# Patient Record
Sex: MALE | Race: WHITE | Employment: FULL TIME | URBAN - METROPOLITAN AREA
[De-identification: names, ages, dates, MRNs, and addresses within clinical notes are randomized per-mention and may not be internally consistent; named-entity substitution may affect disease eponyms.]

---

## 2018-03-15 ENCOUNTER — TELEPHONE (OUTPATIENT)
Dept: ADMINISTRATIVE | Age: 63
End: 2018-03-15

## 2018-03-16 ENCOUNTER — OFFICE VISIT (OUTPATIENT)
Dept: FAMILY MEDICINE CLINIC | Age: 63
End: 2018-03-16
Payer: COMMERCIAL

## 2018-03-16 VITALS
RESPIRATION RATE: 16 BRPM | BODY MASS INDEX: 31.22 KG/M2 | WEIGHT: 223 LBS | HEART RATE: 67 BPM | DIASTOLIC BLOOD PRESSURE: 80 MMHG | SYSTOLIC BLOOD PRESSURE: 139 MMHG | HEIGHT: 71 IN

## 2018-03-16 DIAGNOSIS — G89.29 CHRONIC PAIN OF BOTH KNEES: ICD-10-CM

## 2018-03-16 DIAGNOSIS — M25.562 CHRONIC PAIN OF BOTH KNEES: ICD-10-CM

## 2018-03-16 DIAGNOSIS — F41.1 GENERALIZED ANXIETY DISORDER: Primary | ICD-10-CM

## 2018-03-16 DIAGNOSIS — J45.20 MILD INTERMITTENT ASTHMA WITHOUT COMPLICATION: Chronic | ICD-10-CM

## 2018-03-16 DIAGNOSIS — M25.561 CHRONIC PAIN OF BOTH KNEES: ICD-10-CM

## 2018-03-16 DIAGNOSIS — I10 ESSENTIAL HYPERTENSION: Chronic | ICD-10-CM

## 2018-03-16 PROCEDURE — 99214 OFFICE O/P EST MOD 30 MIN: CPT | Performed by: FAMILY MEDICINE

## 2018-03-16 ASSESSMENT — PATIENT HEALTH QUESTIONNAIRE - PHQ9
1. LITTLE INTEREST OR PLEASURE IN DOING THINGS: 0
SUM OF ALL RESPONSES TO PHQ QUESTIONS 1-9: 0
SUM OF ALL RESPONSES TO PHQ9 QUESTIONS 1 & 2: 0
2. FEELING DOWN, DEPRESSED OR HOPELESS: 0

## 2018-03-23 ENCOUNTER — TELEPHONE (OUTPATIENT)
Dept: FAMILY MEDICINE CLINIC | Age: 63
End: 2018-03-23

## 2018-04-17 ENCOUNTER — OFFICE VISIT (OUTPATIENT)
Dept: FAMILY MEDICINE CLINIC | Age: 63
End: 2018-04-17
Payer: COMMERCIAL

## 2018-04-17 VITALS
RESPIRATION RATE: 16 BRPM | SYSTOLIC BLOOD PRESSURE: 132 MMHG | DIASTOLIC BLOOD PRESSURE: 65 MMHG | BODY MASS INDEX: 32.64 KG/M2 | WEIGHT: 234 LBS | HEART RATE: 55 BPM

## 2018-04-17 DIAGNOSIS — F41.1 GENERALIZED ANXIETY DISORDER: ICD-10-CM

## 2018-04-17 PROCEDURE — 99213 OFFICE O/P EST LOW 20 MIN: CPT | Performed by: FAMILY MEDICINE

## 2018-04-17 ASSESSMENT — PATIENT HEALTH QUESTIONNAIRE - PHQ9
SUM OF ALL RESPONSES TO PHQ9 QUESTIONS 1 & 2: 0
2. FEELING DOWN, DEPRESSED OR HOPELESS: 0
SUM OF ALL RESPONSES TO PHQ QUESTIONS 1-9: 0
1. LITTLE INTEREST OR PLEASURE IN DOING THINGS: 0

## 2018-10-11 ENCOUNTER — HOSPITAL ENCOUNTER (OUTPATIENT)
Age: 63
Discharge: HOME | End: 2018-10-11
Payer: COMMERCIAL

## 2018-10-11 ENCOUNTER — HOSPITAL ENCOUNTER (OUTPATIENT)
Age: 63
Discharge: HOME | End: 2018-10-13
Payer: COMMERCIAL

## 2018-10-11 LAB
ALBUMIN SERPL-MCNC: 4.5 G/DL (ref 3.5–5.2)
ALP BLD-CCNC: 78 U/L (ref 40–129)
ALT SERPL-CCNC: 15 U/L (ref 0–40)
ANION GAP SERPL CALCULATED.3IONS-SCNC: 11 MMOL/L (ref 7–16)
AST SERPL-CCNC: 15 U/L (ref 0–39)
BILIRUB SERPL-MCNC: 0.5 MG/DL (ref 0–1.2)
BUN BLDV-MCNC: 18 MG/DL (ref 8–23)
CALCIUM SERPL-MCNC: 9.7 MG/DL (ref 8.6–10.2)
CHLORIDE BLD-SCNC: 103 MMOL/L (ref 98–107)
CO2: 27 MMOL/L (ref 22–29)
CREAT SERPL-MCNC: 0.9 MG/DL (ref 0.7–1.2)
GFR AFRICAN AMERICAN: >60
GFR NON-AFRICAN AMERICAN: >60 ML/MIN/1.73
GLUCOSE BLD-MCNC: 89 MG/DL (ref 74–109)
POTASSIUM SERPL-SCNC: 4.3 MMOL/L (ref 3.5–5)
SODIUM BLD-SCNC: 141 MMOL/L (ref 132–146)
TOTAL PROTEIN: 7.6 G/DL (ref 6.4–8.3)

## 2018-10-11 PROCEDURE — 36415 COLL VENOUS BLD VENIPUNCTURE: CPT

## 2018-10-11 PROCEDURE — 80053 COMPREHEN METABOLIC PANEL: CPT

## 2018-10-15 ENCOUNTER — OFFICE VISIT (OUTPATIENT)
Dept: FAMILY MEDICINE CLINIC | Age: 63
End: 2018-10-15
Payer: COMMERCIAL

## 2018-10-15 VITALS
RESPIRATION RATE: 18 BRPM | HEIGHT: 71 IN | DIASTOLIC BLOOD PRESSURE: 76 MMHG | WEIGHT: 230 LBS | SYSTOLIC BLOOD PRESSURE: 138 MMHG | BODY MASS INDEX: 32.2 KG/M2 | HEART RATE: 65 BPM

## 2018-10-15 DIAGNOSIS — I10 ESSENTIAL HYPERTENSION: Primary | Chronic | ICD-10-CM

## 2018-10-15 DIAGNOSIS — N52.2 DRUG-INDUCED ERECTILE DYSFUNCTION: ICD-10-CM

## 2018-10-15 DIAGNOSIS — F41.1 GENERALIZED ANXIETY DISORDER: ICD-10-CM

## 2018-10-15 DIAGNOSIS — Z23 NEED FOR INFLUENZA VACCINATION: ICD-10-CM

## 2018-10-15 DIAGNOSIS — E78.2 MIXED HYPERLIPIDEMIA: ICD-10-CM

## 2018-10-15 PROCEDURE — 90686 IIV4 VACC NO PRSV 0.5 ML IM: CPT | Performed by: FAMILY MEDICINE

## 2018-10-15 PROCEDURE — 99214 OFFICE O/P EST MOD 30 MIN: CPT | Performed by: FAMILY MEDICINE

## 2018-10-15 PROCEDURE — 90471 IMMUNIZATION ADMIN: CPT | Performed by: FAMILY MEDICINE

## 2018-10-15 ASSESSMENT — PATIENT HEALTH QUESTIONNAIRE - PHQ9
SUM OF ALL RESPONSES TO PHQ QUESTIONS 1-9: 0
SUM OF ALL RESPONSES TO PHQ QUESTIONS 1-9: 0
1. LITTLE INTEREST OR PLEASURE IN DOING THINGS: 0
SUM OF ALL RESPONSES TO PHQ9 QUESTIONS 1 & 2: 0
2. FEELING DOWN, DEPRESSED OR HOPELESS: 0

## 2018-10-15 NOTE — PROGRESS NOTES
Vaccine Information Sheet, \"Influenza - Inactivated\"  given to Serena Herman, or parent/legal guardian of  Serena Herman and verbalized understanding. Patient responses:    Have you ever had a reaction to a flu vaccine? No  Are you able to eat eggs without adverse effects? Yes  Do you have any current illness? No  Have you ever had Guillian Covina Syndrome? No    Flu vaccine given per order. Please see immunization tab.
rhinorrhea. No sore throat, no hearing loss. No cough, no dyspnea. No chest pain on exertion. No edema. No intermittent claudication. No abdominal pain. No nausea, no change in bowel habits. No joint pain, no swelling. No skin lesions, no rash. No heat or cold intolerance. No weight changes. No flank pain, no hematuria. No genital lesions or abnormalities. Examination:    /76   Pulse 65   Resp 18   Ht 5' 11\" (1.803 m)   Wt 230 lb (104.3 kg)   BMI 32.08 kg/m²   General appearance: healthy, no distress. Alert, oriented x 3. HEENT: unremarkable. Neck: supple. No masses or bruits. No cervical or supraclavicular lymphadenopathy. Thyroid: no goiter. Chest: no deformities. Lungs: clear, no wheezing or crackles. Heart: regular rate and rhythm, normal S1S2,  no murmurs. Abdomen: soft , no hepato or splenomegaly. No bruits. No masses. Extremities: no edema. Vascular: dorsalis pedis/ posterior tibial pulses are normal bilaterally. Gait: normal.  Mood: normal.  Affect: congruent. BMI was elevated today, and weight loss plan recommended is : conventional weight loss and daily exercise regimen. Hospital Outpatient Visit on 10/11/2018   Component Date Value    Sodium 10/11/2018 141     Potassium 10/11/2018 4.3     Chloride 10/11/2018 103     CO2 10/11/2018 27     Anion Gap 10/11/2018 11     Glucose 10/11/2018 89     BUN 10/11/2018 18     CREATININE 10/11/2018 0.9     GFR Non- 10/11/2018 >60     GFR  10/11/2018 >60     Calcium 10/11/2018 9.7     Total Protein 10/11/2018 7.6     Alb 10/11/2018 4.5     Total Bilirubin 10/11/2018 0.5     Alkaline Phosphatase 10/11/2018 78     ALT 10/11/2018 15     AST 10/11/2018 15        Assessment / Plan  1. Need for influenza vaccination  Administered    2. Essential hypertension  Fairly well controlled. Continue same. Limit salt intake and weight loss.     3. Generalized anxiety disorder  Better on zoloft  But

## 2018-10-22 DIAGNOSIS — E78.2 MIXED HYPERLIPIDEMIA: ICD-10-CM

## 2018-10-23 RX ORDER — SIMVASTATIN 40 MG
TABLET ORAL
Qty: 90 TABLET | Refills: 1 | Status: SHIPPED | OUTPATIENT
Start: 2018-10-23 | End: 2019-04-30 | Stop reason: SDUPTHER

## 2019-04-19 ENCOUNTER — HOSPITAL ENCOUNTER (OUTPATIENT)
Age: 64
Discharge: HOME | End: 2019-04-19
Payer: COMMERCIAL

## 2019-04-19 DIAGNOSIS — I10 ESSENTIAL HYPERTENSION: Chronic | ICD-10-CM

## 2019-04-19 DIAGNOSIS — E78.2 MIXED HYPERLIPIDEMIA: ICD-10-CM

## 2019-04-19 LAB
ALBUMIN SERPL-MCNC: 4.1 G/DL (ref 3.5–5.2)
ALP BLD-CCNC: 68 U/L (ref 40–129)
ALT SERPL-CCNC: 16 U/L (ref 0–40)
ANION GAP SERPL CALCULATED.3IONS-SCNC: 8 MMOL/L (ref 7–16)
AST SERPL-CCNC: 14 U/L (ref 0–39)
BILIRUB SERPL-MCNC: 0.5 MG/DL (ref 0–1.2)
BUN BLDV-MCNC: 22 MG/DL (ref 8–23)
CALCIUM SERPL-MCNC: 8.8 MG/DL (ref 8.6–10.2)
CHLORIDE BLD-SCNC: 106 MMOL/L (ref 98–107)
CHOLESTEROL, TOTAL: 134 MG/DL (ref 0–199)
CO2: 25 MMOL/L (ref 22–29)
CREAT SERPL-MCNC: 1 MG/DL (ref 0.7–1.2)
GFR AFRICAN AMERICAN: >60
GFR NON-AFRICAN AMERICAN: >60 ML/MIN/1.73
GLUCOSE BLD-MCNC: 108 MG/DL (ref 74–99)
HDLC SERPL-MCNC: 41 MG/DL
LDL CHOLESTEROL CALCULATED: 69 MG/DL (ref 0–99)
POTASSIUM SERPL-SCNC: 4.4 MMOL/L (ref 3.5–5)
SODIUM BLD-SCNC: 139 MMOL/L (ref 132–146)
TOTAL PROTEIN: 6.7 G/DL (ref 6.4–8.3)
TRIGL SERPL-MCNC: 121 MG/DL (ref 0–149)
VLDLC SERPL CALC-MCNC: 24 MG/DL

## 2019-04-19 PROCEDURE — 80053 COMPREHEN METABOLIC PANEL: CPT

## 2019-04-19 PROCEDURE — 36415 COLL VENOUS BLD VENIPUNCTURE: CPT

## 2019-04-19 PROCEDURE — 80061 LIPID PANEL: CPT

## 2019-04-22 ENCOUNTER — TELEPHONE (OUTPATIENT)
Dept: FAMILY MEDICINE CLINIC | Age: 64
End: 2019-04-22

## 2019-04-22 ENCOUNTER — OFFICE VISIT (OUTPATIENT)
Dept: FAMILY MEDICINE CLINIC | Age: 64
End: 2019-04-22
Payer: COMMERCIAL

## 2019-04-22 VITALS
DIASTOLIC BLOOD PRESSURE: 61 MMHG | WEIGHT: 248 LBS | HEART RATE: 64 BPM | RESPIRATION RATE: 18 BRPM | SYSTOLIC BLOOD PRESSURE: 138 MMHG | OXYGEN SATURATION: 96 % | BODY MASS INDEX: 34.72 KG/M2 | HEIGHT: 71 IN

## 2019-04-22 DIAGNOSIS — E78.2 MIXED HYPERLIPIDEMIA: ICD-10-CM

## 2019-04-22 DIAGNOSIS — M25.561 CHRONIC PAIN OF BOTH KNEES: Chronic | ICD-10-CM

## 2019-04-22 DIAGNOSIS — I25.118 CORONARY ARTERY DISEASE INVOLVING NATIVE CORONARY ARTERY OF NATIVE HEART WITH OTHER FORM OF ANGINA PECTORIS (HCC): Chronic | ICD-10-CM

## 2019-04-22 DIAGNOSIS — I10 ESSENTIAL HYPERTENSION: Primary | Chronic | ICD-10-CM

## 2019-04-22 DIAGNOSIS — J45.20 MILD INTERMITTENT ASTHMA WITHOUT COMPLICATION: Chronic | ICD-10-CM

## 2019-04-22 DIAGNOSIS — F41.1 GENERALIZED ANXIETY DISORDER: ICD-10-CM

## 2019-04-22 DIAGNOSIS — G89.29 CHRONIC PAIN OF BOTH KNEES: Chronic | ICD-10-CM

## 2019-04-22 DIAGNOSIS — M25.562 CHRONIC PAIN OF BOTH KNEES: Chronic | ICD-10-CM

## 2019-04-22 DIAGNOSIS — Z13.1 SCREENING FOR DIABETES MELLITUS: ICD-10-CM

## 2019-04-22 PROCEDURE — 99396 PREV VISIT EST AGE 40-64: CPT | Performed by: FAMILY MEDICINE

## 2019-04-22 ASSESSMENT — PATIENT HEALTH QUESTIONNAIRE - PHQ9
SUM OF ALL RESPONSES TO PHQ QUESTIONS 1-9: 0
2. FEELING DOWN, DEPRESSED OR HOPELESS: 0
SUM OF ALL RESPONSES TO PHQ QUESTIONS 1-9: 0
1. LITTLE INTEREST OR PLEASURE IN DOING THINGS: 0
SUM OF ALL RESPONSES TO PHQ9 QUESTIONS 1 & 2: 0

## 2019-04-22 NOTE — TELEPHONE ENCOUNTER
Schedule lexiscan nuclear stress test ASAP  Angina  History of CAD  Also schedule apt with Dr Eduardo Morrell for same  Thanks.
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
## 2019-04-23 ENCOUNTER — TELEPHONE (OUTPATIENT)
Dept: ADMINISTRATIVE | Age: 64
End: 2019-04-23

## 2019-04-23 NOTE — TELEPHONE ENCOUNTER
Ana Chao called to schedule pt with Dr. Ekta RAM per Dr. Sarah Botello for CAD & CP. Pt is scheduled to have a stress on 4/25/19 @ the hospital.  Past cardiac hx form scanned. Please call the office once you schedule the pt.

## 2019-04-25 ENCOUNTER — TELEPHONE (OUTPATIENT)
Dept: FAMILY MEDICINE CLINIC | Age: 64
End: 2019-04-25

## 2019-04-25 ENCOUNTER — HOSPITAL ENCOUNTER (OUTPATIENT)
Dept: NON INVASIVE DIAGNOSTICS | Age: 64
Discharge: HOME | End: 2019-04-25
Payer: COMMERCIAL

## 2019-04-25 ENCOUNTER — HOSPITAL ENCOUNTER (OUTPATIENT)
Dept: NUCLEAR MEDICINE | Age: 64
Discharge: HOME | End: 2019-04-25
Payer: COMMERCIAL

## 2019-04-25 VITALS — HEIGHT: 71 IN | WEIGHT: 248 LBS | BODY MASS INDEX: 34.72 KG/M2

## 2019-04-25 DIAGNOSIS — I25.118 CORONARY ARTERY DISEASE INVOLVING NATIVE CORONARY ARTERY OF NATIVE HEART WITH OTHER FORM OF ANGINA PECTORIS (HCC): Chronic | ICD-10-CM

## 2019-04-25 LAB
LV EF: 68 %
LVEF MODALITY: NORMAL

## 2019-04-25 PROCEDURE — 93017 CV STRESS TEST TRACING ONLY: CPT

## 2019-04-25 PROCEDURE — 93018 CV STRESS TEST I&R ONLY: CPT | Performed by: INTERNAL MEDICINE

## 2019-04-25 PROCEDURE — 6360000002 HC RX W HCPCS: Performed by: FAMILY MEDICINE

## 2019-04-25 PROCEDURE — A9500 TC99M SESTAMIBI: HCPCS | Performed by: RADIOLOGY

## 2019-04-25 PROCEDURE — 3430000000 HC RX DIAGNOSTIC RADIOPHARMACEUTICAL: Performed by: RADIOLOGY

## 2019-04-25 PROCEDURE — 78452 HT MUSCLE IMAGE SPECT MULT: CPT

## 2019-04-25 PROCEDURE — 93016 CV STRESS TEST SUPVJ ONLY: CPT | Performed by: INTERNAL MEDICINE

## 2019-04-25 RX ADMIN — Medication 10 MILLICURIE: at 09:06

## 2019-04-25 RX ADMIN — REGADENOSON 0.4 MG: 0.08 INJECTION, SOLUTION INTRAVENOUS at 10:08

## 2019-04-25 RX ADMIN — Medication 30 MILLICURIE: at 09:07

## 2019-04-25 NOTE — TELEPHONE ENCOUNTER
Wife, Mace Phalen wanted to know if pt still needed to see the cardiologist tomorrow since his stress test was normal?

## 2019-04-25 NOTE — PROGRESS NOTES
.        Stress Lab:  A lexiscan stress protocol was performed. There was no angina, significant arrhythmia or ST segment shift. The EKG is considered nonischemic. Isotope was injected.

## 2019-04-26 ENCOUNTER — OFFICE VISIT (OUTPATIENT)
Dept: CARDIOLOGY CLINIC | Age: 64
End: 2019-04-26
Payer: COMMERCIAL

## 2019-04-26 VITALS
DIASTOLIC BLOOD PRESSURE: 82 MMHG | RESPIRATION RATE: 16 BRPM | SYSTOLIC BLOOD PRESSURE: 138 MMHG | HEART RATE: 54 BPM | WEIGHT: 249.3 LBS | BODY MASS INDEX: 34.9 KG/M2 | HEIGHT: 71 IN

## 2019-04-26 DIAGNOSIS — I25.119 CORONARY ARTERY DISEASE WITH ANGINA PECTORIS, UNSPECIFIED VESSEL OR LESION TYPE, UNSPECIFIED WHETHER NATIVE OR TRANSPLANTED HEART (HCC): Primary | Chronic | ICD-10-CM

## 2019-04-26 PROCEDURE — 93000 ELECTROCARDIOGRAM COMPLETE: CPT | Performed by: INTERNAL MEDICINE

## 2019-04-26 PROCEDURE — 99213 OFFICE O/P EST LOW 20 MIN: CPT | Performed by: INTERNAL MEDICINE

## 2019-04-26 RX ORDER — NAPROXEN SODIUM 220 MG
220 TABLET ORAL NIGHTLY PRN
COMMUNITY
End: 2020-06-09 | Stop reason: ALTCHOICE

## 2019-04-26 NOTE — TELEPHONE ENCOUNTER
Spoke with patients wife advised to keep cardiology appointment today. Patients wife understands and agrees.

## 2019-04-26 NOTE — PROGRESS NOTES
Gastrointestinal: negative for abdominal pain, diarrhea, nausea and vomiting  Genitourinary:negative for dysuria and hematuria  Derm: negative for rash and skin lesion(s)  Neurological: negative for seizures and tremors  Endocrine: negative for diabetic symptoms including polydipsia and polyuria  Musculoskeletal: negative for CTD  Psychiatric: negative for psychosis and major depression    On examination, he is an alert, comfortable, middle-aged  male in no distress. Skin is warm and dry. Respirations are unlabored. /82   Pulse 54   Resp 16   Ht 5' 11\" (1.803 m)   Wt 249 lb 4.8 oz (113.1 kg)   BMI 34.77 kg/m² . HEENT negative for scleral icterus. Extraocular muscles intact. No facial asymmetry or central cyanosis. Neck without masses or goiter. No bruit or JVD. Cardiac apex not displaced. Rhythm regular. Abdomen normal.  Extremities without edema. Lungs are clear. EKG today shows sinus bradycardia and RBBB. The patient has a normal stress perfusion study. This does not exclude the possibility that his symptoms represented angina, although it is associated with a extremely low risk for a myocardial infarction over the next 2 years. If his symptoms would recur and become more typical for angina, then beta blocker should be increased and possibly nitrates added. He was reluctant to change anything today. Medications are reviewed. He is strongly advised regarding aerobic activity and diet modification to lose weight. Target abdominal girth is less than 38 inches. The Mediterranean diet is advised. He will be seen back in one year.     At completion of today's visit, medications include the following:    Current Outpatient Medications:     naproxen sodium (ALEVE) 220 MG tablet, Take 220 mg by mouth nightly as needed , Disp: , Rfl:     simvastatin (ZOCOR) 40 MG tablet, One daily, Disp: 90 tablet, Rfl: 1    metoprolol tartrate (LOPRESSOR) 25 MG tablet, Take 1 tablet by mouth 2 times daily, Disp: 180 tablet, Rfl: 1    aspirin 81 MG tablet, Take 81 mg by mouth daily. , Disp: , Rfl:       eHro Pierre MD

## 2019-04-29 DIAGNOSIS — E78.2 MIXED HYPERLIPIDEMIA: ICD-10-CM

## 2019-04-29 DIAGNOSIS — I10 ESSENTIAL HYPERTENSION: Chronic | ICD-10-CM

## 2019-04-30 RX ORDER — SIMVASTATIN 40 MG
TABLET ORAL
Qty: 90 TABLET | Refills: 1 | Status: SHIPPED | OUTPATIENT
Start: 2019-04-30 | End: 2019-10-29 | Stop reason: SDUPTHER

## 2019-10-29 ENCOUNTER — HOSPITAL ENCOUNTER (OUTPATIENT)
Dept: GENERAL RADIOLOGY | Age: 64
Discharge: HOME | End: 2019-10-31
Payer: COMMERCIAL

## 2019-10-29 ENCOUNTER — OFFICE VISIT (OUTPATIENT)
Dept: FAMILY MEDICINE CLINIC | Age: 64
End: 2019-10-29
Payer: COMMERCIAL

## 2019-10-29 ENCOUNTER — HOSPITAL ENCOUNTER (OUTPATIENT)
Age: 64
Discharge: HOME | End: 2019-10-31
Payer: COMMERCIAL

## 2019-10-29 ENCOUNTER — HOSPITAL ENCOUNTER (OUTPATIENT)
Age: 64
Discharge: HOME | End: 2019-10-29
Payer: COMMERCIAL

## 2019-10-29 VITALS
SYSTOLIC BLOOD PRESSURE: 133 MMHG | BODY MASS INDEX: 35.42 KG/M2 | HEIGHT: 71 IN | DIASTOLIC BLOOD PRESSURE: 71 MMHG | RESPIRATION RATE: 16 BRPM | HEART RATE: 67 BPM | WEIGHT: 253 LBS

## 2019-10-29 DIAGNOSIS — M25.562 CHRONIC PAIN OF LEFT KNEE: ICD-10-CM

## 2019-10-29 DIAGNOSIS — I10 ESSENTIAL HYPERTENSION: Chronic | ICD-10-CM

## 2019-10-29 DIAGNOSIS — R73.03 PREDIABETES: ICD-10-CM

## 2019-10-29 DIAGNOSIS — G89.29 CHRONIC PAIN OF LEFT KNEE: ICD-10-CM

## 2019-10-29 DIAGNOSIS — G89.29 CHRONIC PAIN OF RIGHT KNEE: ICD-10-CM

## 2019-10-29 DIAGNOSIS — M25.561 CHRONIC PAIN OF RIGHT KNEE: ICD-10-CM

## 2019-10-29 DIAGNOSIS — E78.2 MIXED HYPERLIPIDEMIA: ICD-10-CM

## 2019-10-29 DIAGNOSIS — M25.552 LEFT HIP PAIN: ICD-10-CM

## 2019-10-29 DIAGNOSIS — I10 ESSENTIAL HYPERTENSION: Primary | Chronic | ICD-10-CM

## 2019-10-29 DIAGNOSIS — I25.10 CORONARY ARTERY DISEASE INVOLVING NATIVE CORONARY ARTERY OF NATIVE HEART WITHOUT ANGINA PECTORIS: Chronic | ICD-10-CM

## 2019-10-29 DIAGNOSIS — M25.551 RIGHT HIP PAIN: ICD-10-CM

## 2019-10-29 DIAGNOSIS — Z13.1 SCREENING FOR DIABETES MELLITUS: ICD-10-CM

## 2019-10-29 LAB
ALBUMIN SERPL-MCNC: 4.4 G/DL (ref 3.5–5.2)
ALP BLD-CCNC: 73 U/L (ref 40–129)
ALT SERPL-CCNC: 17 U/L (ref 0–40)
ANION GAP SERPL CALCULATED.3IONS-SCNC: 10 MMOL/L (ref 7–16)
AST SERPL-CCNC: 16 U/L (ref 0–39)
BILIRUB SERPL-MCNC: 0.5 MG/DL (ref 0–1.2)
BUN BLDV-MCNC: 19 MG/DL (ref 8–23)
CALCIUM SERPL-MCNC: 9.4 MG/DL (ref 8.6–10.2)
CHLORIDE BLD-SCNC: 103 MMOL/L (ref 98–107)
CHOLESTEROL, TOTAL: 148 MG/DL (ref 0–199)
CO2: 26 MMOL/L (ref 22–29)
CREAT SERPL-MCNC: 1 MG/DL (ref 0.7–1.2)
GFR AFRICAN AMERICAN: >60
GFR NON-AFRICAN AMERICAN: >60 ML/MIN/1.73
GLUCOSE BLD-MCNC: 118 MG/DL (ref 74–99)
HBA1C MFR BLD: 5.8 % (ref 4–5.6)
HDLC SERPL-MCNC: 46 MG/DL
LDL CHOLESTEROL CALCULATED: 74 MG/DL (ref 0–99)
POTASSIUM SERPL-SCNC: 4.6 MMOL/L (ref 3.5–5)
SODIUM BLD-SCNC: 139 MMOL/L (ref 132–146)
TOTAL PROTEIN: 7.2 G/DL (ref 6.4–8.3)
TRIGL SERPL-MCNC: 140 MG/DL (ref 0–149)
VLDLC SERPL CALC-MCNC: 28 MG/DL

## 2019-10-29 PROCEDURE — 99214 OFFICE O/P EST MOD 30 MIN: CPT | Performed by: FAMILY MEDICINE

## 2019-10-29 PROCEDURE — 73502 X-RAY EXAM HIP UNI 2-3 VIEWS: CPT

## 2019-10-29 PROCEDURE — 83036 HEMOGLOBIN GLYCOSYLATED A1C: CPT

## 2019-10-29 PROCEDURE — 36415 COLL VENOUS BLD VENIPUNCTURE: CPT

## 2019-10-29 PROCEDURE — 90471 IMMUNIZATION ADMIN: CPT | Performed by: FAMILY MEDICINE

## 2019-10-29 PROCEDURE — 73562 X-RAY EXAM OF KNEE 3: CPT

## 2019-10-29 PROCEDURE — 90686 IIV4 VACC NO PRSV 0.5 ML IM: CPT | Performed by: FAMILY MEDICINE

## 2019-10-29 PROCEDURE — 80053 COMPREHEN METABOLIC PANEL: CPT

## 2019-10-29 PROCEDURE — 80061 LIPID PANEL: CPT

## 2019-10-29 RX ORDER — SIMVASTATIN 40 MG
TABLET ORAL
Qty: 90 TABLET | Refills: 1 | Status: SHIPPED
Start: 2019-10-29 | End: 2020-05-18 | Stop reason: SDUPTHER

## 2019-11-01 ENCOUNTER — TELEPHONE (OUTPATIENT)
Dept: FAMILY MEDICINE CLINIC | Age: 64
End: 2019-11-01

## 2019-11-01 DIAGNOSIS — M25.561 CHRONIC PAIN OF BOTH KNEES: Primary | ICD-10-CM

## 2019-11-01 DIAGNOSIS — M25.552 BILATERAL HIP PAIN: ICD-10-CM

## 2019-11-01 DIAGNOSIS — M25.551 BILATERAL HIP PAIN: ICD-10-CM

## 2019-11-01 DIAGNOSIS — M25.562 CHRONIC PAIN OF BOTH KNEES: Primary | ICD-10-CM

## 2019-11-01 DIAGNOSIS — G89.29 CHRONIC PAIN OF BOTH KNEES: Primary | ICD-10-CM

## 2019-12-04 ENCOUNTER — TELEPHONE (OUTPATIENT)
Dept: FAMILY MEDICINE CLINIC | Age: 64
End: 2019-12-04

## 2019-12-10 ENCOUNTER — OFFICE VISIT (OUTPATIENT)
Dept: FAMILY MEDICINE CLINIC | Age: 64
End: 2019-12-10
Payer: COMMERCIAL

## 2019-12-10 VITALS
WEIGHT: 254 LBS | HEART RATE: 61 BPM | DIASTOLIC BLOOD PRESSURE: 61 MMHG | RESPIRATION RATE: 16 BRPM | SYSTOLIC BLOOD PRESSURE: 120 MMHG | BODY MASS INDEX: 35.56 KG/M2 | HEIGHT: 71 IN

## 2019-12-10 DIAGNOSIS — M25.562 CHRONIC PAIN OF BOTH KNEES: Primary | ICD-10-CM

## 2019-12-10 DIAGNOSIS — M25.551 BILATERAL HIP PAIN: ICD-10-CM

## 2019-12-10 DIAGNOSIS — M25.552 BILATERAL HIP PAIN: ICD-10-CM

## 2019-12-10 DIAGNOSIS — G89.29 CHRONIC PAIN OF BOTH KNEES: Primary | ICD-10-CM

## 2019-12-10 DIAGNOSIS — M25.561 CHRONIC PAIN OF BOTH KNEES: Primary | ICD-10-CM

## 2019-12-10 PROCEDURE — 99212 OFFICE O/P EST SF 10 MIN: CPT | Performed by: FAMILY MEDICINE

## 2019-12-10 ASSESSMENT — PATIENT HEALTH QUESTIONNAIRE - PHQ9
1. LITTLE INTEREST OR PLEASURE IN DOING THINGS: 0
SUM OF ALL RESPONSES TO PHQ QUESTIONS 1-9: 0
SUM OF ALL RESPONSES TO PHQ9 QUESTIONS 1 & 2: 0
SUM OF ALL RESPONSES TO PHQ QUESTIONS 1-9: 0
2. FEELING DOWN, DEPRESSED OR HOPELESS: 0

## 2020-05-18 RX ORDER — SIMVASTATIN 40 MG
TABLET ORAL
Qty: 90 TABLET | Refills: 0 | Status: SHIPPED
Start: 2020-05-18 | End: 2020-06-09 | Stop reason: SDUPTHER

## 2020-06-09 ENCOUNTER — HOSPITAL ENCOUNTER (OUTPATIENT)
Age: 65
Discharge: HOME | End: 2020-06-11
Payer: COMMERCIAL

## 2020-06-09 ENCOUNTER — OFFICE VISIT (OUTPATIENT)
Dept: FAMILY MEDICINE CLINIC | Age: 65
End: 2020-06-09
Payer: COMMERCIAL

## 2020-06-09 PROCEDURE — 99214 OFFICE O/P EST MOD 30 MIN: CPT | Performed by: FAMILY MEDICINE

## 2020-06-09 PROCEDURE — 85651 RBC SED RATE NONAUTOMATED: CPT

## 2020-06-09 PROCEDURE — 86140 C-REACTIVE PROTEIN: CPT

## 2020-06-09 RX ORDER — SIMVASTATIN 40 MG
TABLET ORAL
Qty: 90 TABLET | Refills: 2 | Status: SHIPPED
Start: 2020-06-09 | End: 2021-02-02 | Stop reason: SDUPTHER

## 2020-06-09 ASSESSMENT — PATIENT HEALTH QUESTIONNAIRE - PHQ9
2. FEELING DOWN, DEPRESSED OR HOPELESS: 0
SUM OF ALL RESPONSES TO PHQ QUESTIONS 1-9: 0
1. LITTLE INTEREST OR PLEASURE IN DOING THINGS: 0
SUM OF ALL RESPONSES TO PHQ9 QUESTIONS 1 & 2: 0
SUM OF ALL RESPONSES TO PHQ QUESTIONS 1-9: 0

## 2020-06-09 ASSESSMENT — ENCOUNTER SYMPTOMS
DIARRHEA: 0
WHEEZING: 0
BLOOD IN STOOL: 1
RHINORRHEA: 0
COUGH: 0
CHEST TIGHTNESS: 0
EYE DISCHARGE: 0
CONSTIPATION: 0
EYE ITCHING: 0
SHORTNESS OF BREATH: 0
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
## 2020-06-10 VITALS
HEART RATE: 67 BPM | RESPIRATION RATE: 18 BRPM | HEIGHT: 71 IN | OXYGEN SATURATION: 97 % | TEMPERATURE: 98.6 F | SYSTOLIC BLOOD PRESSURE: 124 MMHG | DIASTOLIC BLOOD PRESSURE: 72 MMHG | BODY MASS INDEX: 34.3 KG/M2 | WEIGHT: 245 LBS

## 2020-06-10 LAB
C-REACTIVE PROTEIN: 0.1 MG/DL (ref 0–0.4)
SEDIMENTATION RATE, ERYTHROCYTE: 26 MM/HR (ref 0–15)

## 2020-06-10 NOTE — RESULT ENCOUNTER NOTE
Please call patient. His sed rate is slightly elevated but because his CRP is not, it is unlikely that he has an inflammatory arthritis that is causing his knee pain. Going to orthopedics is the best next step.

## 2020-06-25 ENCOUNTER — OFFICE VISIT (OUTPATIENT)
Dept: ORTHOPEDIC SURGERY | Age: 65
End: 2020-06-25
Payer: COMMERCIAL

## 2020-06-25 VITALS — WEIGHT: 250 LBS | TEMPERATURE: 98.4 F | HEIGHT: 70 IN | BODY MASS INDEX: 35.79 KG/M2

## 2020-06-25 PROCEDURE — 99203 OFFICE O/P NEW LOW 30 MIN: CPT | Performed by: ORTHOPAEDIC SURGERY

## 2021-01-30 ENCOUNTER — HOSPITAL ENCOUNTER (OUTPATIENT)
Age: 66
Discharge: HOME | End: 2021-01-30
Payer: COMMERCIAL

## 2021-01-30 DIAGNOSIS — I10 ESSENTIAL HYPERTENSION: Chronic | ICD-10-CM

## 2021-01-30 LAB
ALBUMIN SERPL-MCNC: 4.1 G/DL (ref 3.5–5.2)
ALP BLD-CCNC: 78 U/L (ref 40–129)
ALT SERPL-CCNC: 14 U/L (ref 0–40)
ANION GAP SERPL CALCULATED.3IONS-SCNC: 8 MMOL/L (ref 7–16)
AST SERPL-CCNC: 13 U/L (ref 0–39)
BILIRUB SERPL-MCNC: 0.6 MG/DL (ref 0–1.2)
BUN BLDV-MCNC: 19 MG/DL (ref 8–23)
CALCIUM SERPL-MCNC: 8.8 MG/DL (ref 8.6–10.2)
CHLORIDE BLD-SCNC: 104 MMOL/L (ref 98–107)
CHOLESTEROL, TOTAL: 143 MG/DL (ref 0–199)
CO2: 26 MMOL/L (ref 22–29)
CREAT SERPL-MCNC: 1 MG/DL (ref 0.7–1.2)
GFR AFRICAN AMERICAN: >60
GFR NON-AFRICAN AMERICAN: >60 ML/MIN/1.73
GLUCOSE BLD-MCNC: 107 MG/DL (ref 74–99)
HBA1C MFR BLD: 5.9 % (ref 4–5.6)
HDLC SERPL-MCNC: 43 MG/DL
LDL CHOLESTEROL CALCULATED: 74 MG/DL (ref 0–99)
POTASSIUM SERPL-SCNC: 4.5 MMOL/L (ref 3.5–5)
SODIUM BLD-SCNC: 138 MMOL/L (ref 132–146)
TOTAL PROTEIN: 7 G/DL (ref 6.4–8.3)
TRIGL SERPL-MCNC: 130 MG/DL (ref 0–149)
TSH SERPL DL<=0.05 MIU/L-ACNC: 1.5 UIU/ML (ref 0.27–4.2)
VLDLC SERPL CALC-MCNC: 26 MG/DL

## 2021-01-30 PROCEDURE — 80053 COMPREHEN METABOLIC PANEL: CPT

## 2021-01-30 PROCEDURE — 83036 HEMOGLOBIN GLYCOSYLATED A1C: CPT

## 2021-01-30 PROCEDURE — 80061 LIPID PANEL: CPT

## 2021-01-30 PROCEDURE — 36415 COLL VENOUS BLD VENIPUNCTURE: CPT

## 2021-01-30 PROCEDURE — 84443 ASSAY THYROID STIM HORMONE: CPT

## 2021-02-01 NOTE — PROGRESS NOTES
Uzair Viramontes (:  1955) is a 63 y.o. male,Established patient, here for evaluation of the following chief complaint(s):  Hypertension (per patient no issues )      ASSESSMENT/PLAN:  1. Coronary artery disease involving native coronary artery of native heart without angina pectoris  -     simvastatin (ZOCOR) 40 MG tablet; Take 1 tablet by mouth nightly One daily, Disp-30 tablet, R-5Normal  -     metoprolol tartrate (LOPRESSOR) 25 MG tablet; Take 1 tablet by mouth 2 times daily, Disp-60 tablet, R-5Normal  Continues on the ASA. Stable. Reviewed labs. Encouraged low carb diet and gave info on this. Encouraged exercise. Pt last saw cards in 2019.  2. Essential hypertension  -     metoprolol tartrate (LOPRESSOR) 25 MG tablet; Take 1 tablet by mouth 2 times daily, Disp-60 tablet, R-5 Normal  Stable on current meds. Reviewed labs. 3. Mixed hyperlipidemia  -     simvastatin (ZOCOR) 40 MG tablet; Take 1 tablet by mouth nightly One daily, Disp-30 tablet, R-5Normal  meds continued. Reviewed labs. 4. Will need to cont to watch glucose as it continues to be high. Ed on low carb diet. Will recheck in 6 mn.      5. ED - pt will see if correcting sugar helps some. He will also call me with the name of the med that he is seeing on TV. I am not hopeful that it will help but we can try if it is appropriate. He had low testoterone but is not currently treated (tested in ). PSA at that time was not done. Pt would need PSA screening and EDWINA as he is over 50 prior to starting testoterone. Return in about 6 months (around 2021) for HTN, elevated glucose. SUBJECTIVE/OBJECTIVE:  HPI     No complaints. Seaspma; allertids. Pt had labs done yesterday. TSH, CMP normal.  Lipids  WNL. Reviewed today. HbA1c - elevated. Needs meformin or increased exercise, diet change. Not interested right now in meds. He quit his gym membership. He was at [facility]. He has gained some weight. COVID has not been easy. His downfall is chocolate. Is willing to concentrate more on foods and exercise. CAD with HTN   On metoprolol, statin, ASA  Takes meds. No Cp/p, no SOB or increasing SOB, no abd pain, no stroke-like symptoms, no dizziness, no headaches. Knee patellofemoral syndrome - did he go to PT. Did not go. He started climbing the steps at home. Left is still bothering him a bit but it is better than previous. Trouble getting an ED, no erection in the AM.  It is getting worse than before. No urinary complaints. Review of Systems  - see above    Physical Exam  Vitals signs and nursing note reviewed. Constitutional:       Appearance: Normal appearance. He is well-developed. He is not ill-appearing or toxic-appearing. HENT:      Head: Normocephalic and atraumatic. Eyes:      General: No scleral icterus. Right eye: No discharge. Left eye: No discharge. Neck:      Musculoskeletal: Neck supple. Thyroid: No thyromegaly. Vascular: No carotid bruit. Cardiovascular:      Rate and Rhythm: Normal rate and regular rhythm. Heart sounds: Normal heart sounds, S1 normal and S2 normal. No murmur. No friction rub. No gallop. Pulmonary:      Effort: Pulmonary effort is normal. No respiratory distress. Breath sounds: Normal breath sounds and air entry. No decreased breath sounds, wheezing or rales. Abdominal:      Palpations: Abdomen is soft. Tenderness: There is no abdominal tenderness. Musculoskeletal:      Right lower leg: No edema. Left lower leg: No edema. Lymphadenopathy:      Cervical: No cervical adenopathy. Skin:     General: Skin is warm and dry. Neurological:      General: No focal deficit present. Mental Status: He is alert and oriented to person, place, and time. Gait: Gait normal.   Psychiatric:         Attention and Perception: Attention normal.         Mood and Affect: Mood and affect normal.         Speech: Speech normal.         Behavior: Behavior normal. Behavior is cooperative. Thought Content: Thought content normal.         Cognition and Memory: Cognition and memory normal.         Judgment: Judgment normal.                 An electronic signature was used to authenticate this note.     --Carmine Hodgkins, MD

## 2021-02-02 ENCOUNTER — OFFICE VISIT (OUTPATIENT)
Dept: FAMILY MEDICINE CLINIC | Age: 66
End: 2021-02-02
Payer: COMMERCIAL

## 2021-02-02 VITALS
HEART RATE: 74 BPM | SYSTOLIC BLOOD PRESSURE: 139 MMHG | OXYGEN SATURATION: 97 % | WEIGHT: 247 LBS | BODY MASS INDEX: 35.36 KG/M2 | DIASTOLIC BLOOD PRESSURE: 78 MMHG | TEMPERATURE: 97 F | RESPIRATION RATE: 16 BRPM | HEIGHT: 70 IN

## 2021-02-02 DIAGNOSIS — I10 ESSENTIAL HYPERTENSION: Chronic | ICD-10-CM

## 2021-02-02 DIAGNOSIS — E78.2 MIXED HYPERLIPIDEMIA: ICD-10-CM

## 2021-02-02 DIAGNOSIS — I25.10 CORONARY ARTERY DISEASE INVOLVING NATIVE CORONARY ARTERY OF NATIVE HEART WITHOUT ANGINA PECTORIS: Primary | Chronic | ICD-10-CM

## 2021-02-02 PROCEDURE — 99214 OFFICE O/P EST MOD 30 MIN: CPT | Performed by: FAMILY MEDICINE

## 2021-02-02 RX ORDER — SIMVASTATIN 40 MG
40 TABLET ORAL NIGHTLY
Qty: 30 TABLET | Refills: 5 | Status: SHIPPED
Start: 2021-02-02 | End: 2021-11-22

## 2021-02-02 ASSESSMENT — PATIENT HEALTH QUESTIONNAIRE - PHQ9
SUM OF ALL RESPONSES TO PHQ QUESTIONS 1-9: 0
2. FEELING DOWN, DEPRESSED OR HOPELESS: 0
SUM OF ALL RESPONSES TO PHQ QUESTIONS 1-9: 0
SUM OF ALL RESPONSES TO PHQ9 QUESTIONS 1 & 2: 0

## 2021-02-02 NOTE — PATIENT INSTRUCTIONS
Try to increase muscle mass and this will help with increasing your insulin sensitivity and decreasing glucose. Patient Education        Learning About Low-Carbohydrate Foods  What foods are low in carbohydrate? The foods you eat contain nutrients, such as vitamins and minerals. Carbohydrate is a nutrient. Your body needs the right amount to stay healthy and work as it should. You can use the list below to help you make choices about which foods to eat. Some foods that are lower in carbohydrate include:  Dairy and dairy alternatives  · Cheese  · Cottage cheese  · Cream cheese  · Nut milk (unsweetened)  · Soy milk (unsweetened)  · Yogurt (Greek, plain)  Fruits  · Avocado  · [employer] and other protein foods  · Almonds  · Beef  · Chicken  · Cod  · Eggs  · Halibut  · Peanut butter and other nut butters  · Pistachios  · Pork  · Pumpkin seeds  · Tofu  · Trout  · [place]  · [place] ([facility])  · Walnuts  Vegetables  · Broccoli  · Carrots  · Cauliflower  · Green beans  · Mushrooms  · Peppers  · Salad greens  · Spinach  · Tomatoes  Work with your doctor to find out how much of this nutrient you need. Depending on your health, you may need more or less of it in your diet. Where can you learn more? Go to https://[email or web address] and sign in to your [employer] account. Enter [phone] in the [facility] box to learn more about \"Learning About Low-Carbohydrate Foods. \"     If you do not have an account, please click on the \"Sign Up Now\" link. Current as of: August 22, 2019               Content Version: 12.6  © 1126-0626 [employer]. Care instructions adapted under license by [facility] ([facility]). If you have questions about a medical condition or this instruction, always ask your healthcare professional. [place] [address] any warranty or liability for your use of this information.          Patient Education        Learning About Low-Carbohydrate Diets What is a low-carbohydrate diet? A low-carbohydrate (or \"low-carb\") diet limits foods and drinks that have carbohydrates. This includes grains, fruits, milk and yogurt, and starchy vegetables like potatoes, beans, and corn. It also avoids foods and drinks that have added sugar. Instead, low-carb diets include foods that are high in protein and fat. Why might you follow a low-carb diet? Low-carb diets may be used for a variety of reasons, such as for weight loss. People who have diabetes may use a low-carb diet to help manage their blood sugar levels. What should you do before you start the diet? Talk to your doctor before you try any diet. This is even more important if you have health problems like kidney disease, heart disease, or diabetes. Your doctor may suggest that you meet with a registered dietitian. A dietitian can help you make an eating plan that works for you. What foods do you eat on a low-carb diet? On a low-carb diet, you choose foods that are high in protein and fat. Examples of these are:  · Meat, poultry, and fish. · Eggs. · Nuts, such as walnuts, pecans, almonds, and peanuts. · Peanut butter and other nut butters. · Tofu. · Avocado. · [employer]. · Non-starchy vegetables like broccoli, cauliflower, green beans, mushrooms, peppers, lettuce, and spinach. · Unsweetened non-dairy milks like almond milk and coconut milk. · Cheese, cottage cheese, and cream cheese. Current as of: August 22, 2019               Content Version: 12.6  © 2940-2903 [employer] Inc[employer]. Care instructions adapted under license by [facility] ([facility]). If you have questions about a medical condition or this instruction, always ask your healthcare professional. [address] any warranty or liability for your use of this information.

## 2021-08-02 ENCOUNTER — OFFICE VISIT (OUTPATIENT)
Dept: FAMILY MEDICINE CLINIC | Age: 66
End: 2021-08-02
Payer: COMMERCIAL

## 2021-08-02 VITALS
SYSTOLIC BLOOD PRESSURE: 129 MMHG | OXYGEN SATURATION: 98 % | WEIGHT: 247 LBS | RESPIRATION RATE: 16 BRPM | BODY MASS INDEX: 35.36 KG/M2 | HEART RATE: 58 BPM | TEMPERATURE: 97.8 F | DIASTOLIC BLOOD PRESSURE: 64 MMHG | HEIGHT: 70 IN

## 2021-08-02 DIAGNOSIS — R22.32 NODULE OF SKIN OF LEFT HAND: Primary | ICD-10-CM

## 2021-08-02 DIAGNOSIS — L08.9 SKIN INFECTION: ICD-10-CM

## 2021-08-02 PROCEDURE — 99213 OFFICE O/P EST LOW 20 MIN: CPT | Performed by: STUDENT IN AN ORGANIZED HEALTH CARE EDUCATION/TRAINING PROGRAM

## 2021-08-02 RX ORDER — SULFAMETHOXAZOLE AND TRIMETHOPRIM 800; 160 MG/1; MG/1
1 TABLET ORAL 2 TIMES DAILY
Qty: 20 TABLET | Refills: 0 | Status: SHIPPED | OUTPATIENT
Start: 2021-08-02 | End: 2021-08-12

## 2021-08-02 SDOH — ECONOMIC STABILITY: FOOD INSECURITY: WITHIN THE PAST 12 MONTHS, THE FOOD YOU BOUGHT JUST DIDN'T LAST AND YOU DIDN'T HAVE MONEY TO GET MORE.: NEVER TRUE

## 2021-08-02 SDOH — ECONOMIC STABILITY: FOOD INSECURITY: WITHIN THE PAST 12 MONTHS, YOU WORRIED THAT YOUR FOOD WOULD RUN OUT BEFORE YOU GOT MONEY TO BUY MORE.: NEVER TRUE

## 2021-08-02 ASSESSMENT — SOCIAL DETERMINANTS OF HEALTH (SDOH): HOW HARD IS IT FOR YOU TO PAY FOR THE VERY BASICS LIKE FOOD, HOUSING, MEDICAL CARE, AND HEATING?: NOT HARD AT ALL

## 2021-08-02 NOTE — PROGRESS NOTES
S: 61 y.o. male with   Chief Complaint   Patient presents with    Cyst     started 2 wks: left outer hand, red, swollen, painful,burns,itchy       Cyst on hand    O: VS:  height is 5' 10\" (1.778 m) and weight is 247 lb (112 kg). His temporal temperature is 97.8 °F (36.6 °C). His blood pressure is 129/64 and his pulse is 58. His respiration is 16 and oxygen saturation is 98%. BP Readings from Last 3 Encounters:   08/02/21 129/64   02/02/21 139/78   06/09/20 124/72     See resident note      Dorsum L hand    Impression/Plan:   1) Hand Lesion - will tx with abx given him trying to drain with a needle. Will re-check Friday. Health Maintenance Due   Topic Date Due    COVID-19 Vaccine (1) Never done    Shingles Vaccine (1 of 2) Never done         Attending Physician Statement  I have discussed the case, including pertinent history and exam findings with the resident. I also have seen the patient and performed key portions of the examination. I agree with the documented assessment and plan.       Hernan Jarrett MD
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
## 2021-08-04 ASSESSMENT — ENCOUNTER SYMPTOMS
COUGH: 0
TROUBLE SWALLOWING: 0
CONSTIPATION: 0
VOMITING: 0
DIARRHEA: 0
ABDOMINAL PAIN: 0
SORE THROAT: 0
PHOTOPHOBIA: 0
EYE PAIN: 0
ABDOMINAL DISTENTION: 0
SHORTNESS OF BREATH: 0
NAUSEA: 0

## 2021-08-09 ENCOUNTER — OFFICE VISIT (OUTPATIENT)
Dept: FAMILY MEDICINE CLINIC | Age: 66
End: 2021-08-09
Payer: COMMERCIAL

## 2021-08-09 VITALS
BODY MASS INDEX: 34.93 KG/M2 | OXYGEN SATURATION: 97 % | SYSTOLIC BLOOD PRESSURE: 138 MMHG | RESPIRATION RATE: 18 BRPM | HEART RATE: 63 BPM | WEIGHT: 244 LBS | DIASTOLIC BLOOD PRESSURE: 72 MMHG | TEMPERATURE: 97.6 F | HEIGHT: 70 IN

## 2021-08-09 DIAGNOSIS — R22.32 NODULE OF SKIN OF LEFT HAND: Primary | ICD-10-CM

## 2021-08-09 DIAGNOSIS — I10 ESSENTIAL HYPERTENSION: Chronic | ICD-10-CM

## 2021-08-09 PROCEDURE — 99213 OFFICE O/P EST LOW 20 MIN: CPT | Performed by: STUDENT IN AN ORGANIZED HEALTH CARE EDUCATION/TRAINING PROGRAM

## 2021-08-09 NOTE — PATIENT INSTRUCTIONS
Patient Education        Learning About the Mediterranean Diet  What is the [address]? The Mediterranean diet is a style of eating rather than a diet plan. It features foods eaten in [place], [place], [place] and [place], and other countries along the [facility]. It emphasizes eating foods like fish, fruits, vegetables, beans, high-fiber breads and whole grains, nuts, and olive oil. This style of eating includes limited red meat, cheese, and sweets. Why choose the Mediterranean diet? A Mediterranean-style diet may improve heart health. It contains more fat than other heart-healthy diets. But the fats are mainly from nuts, unsaturated oils (such as fish oils and olive oil), and certain nut or seed oils (such as canola, soybean, or flaxseed oil). These fats may help protect the heart and blood vessels. How can you get started on the Mediterranean diet? Here are some things you can do to switch to a more Mediterranean way of eating. What to eat  · Eat a variety of fruits and vegetables each day, such as grapes, blueberries, tomatoes, broccoli, peppers, figs, olives, spinach, eggplant, beans, lentils, and chickpeas. · Eat a variety of whole-grain foods each day, such as oats, brown rice, and whole wheat bread, pasta, and couscous. · Eat fish at least 2 times a week. Try tuna, salmon, mackerel, lake trout, herring, or sardines. · Eat moderate amounts of low-fat dairy products, such as milk, cheese, or yogurt. · Eat moderate amounts of poultry and eggs. · Choose healthy (unsaturated) fats, such as nuts, olive oil, and certain nut or seed oils like canola, soybean, and flaxseed. · Limit unhealthy (saturated) fats, such as butter, palm oil, and coconut oil. And limit fats found in animal products, such as meat and dairy products made with whole milk. Try to eat red meat only a few times a month in very small amounts. · Limit sweets and desserts to only a few times a week.  This includes sugar-sweetened drinks like soda. The Mediterranean diet may also include red wine with your meal1 glass each day for women and up to 2 glasses a day for men. Tips for eating at home  · Use herbs, spices, garlic, lemon zest, and citrus juice instead of salt to add flavor to foods. · Add avocado slices to your sandwich instead of spring. · Have fish for lunch or dinner instead of red meat. Brush the fish with olive oil, and broil or grill it. · Sprinkle your salad with seeds or nuts instead of cheese. · Cook with olive or canola oil instead of butter or oils that are high in saturated fat. · Switch from 2% milk or whole milk to 1% or fat-free milk. · Dip raw vegetables in a vinaigrette dressing or hummus instead of dips made from mayonnaise or sour cream.  · Have a piece of fruit for dessert instead of a piece of cake. Try baked apples, or have some dried fruit. Tips for eating out  · Try broiled, grilled, baked, or poached fish instead of having it fried or breaded. · Ask your  to have your meals prepared with olive oil instead of butter. · Order dishes made with marinara sauce or sauces made from olive oil. Avoid sauces made from cream or mayonnaise. · Choose whole-grain breads, whole wheat pasta and pizza crust, brown rice, beans, and lentils. · Cut back on butter or margarine on bread. Instead, you can dip your bread in a small amount of olive oil. · Ask for a side salad or grilled vegetables instead of french fries or chips. Where can you learn more? Go to [email or web address] and sign in to your tic account. Enter [phone] in the [facility] box to learn more about \"Learning About the Mediterranean Diet. \"     If you do not have an account, please click on the \"Sign Up Now\" link. Current as of: December 17, 2020               Content Version: 12.9  © 6838-8524 Healthwise, Incorporated. Care instructions adapted under license by [facility] ([facility]).  If you have questions about a medical condition or this instruction, always ask your healthcare professional. [place] [address] any warranty or liability for your use of this information.
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
## 2021-08-11 ENCOUNTER — OFFICE VISIT (OUTPATIENT)
Dept: SURGERY | Age: 66
End: 2021-08-11
Payer: COMMERCIAL

## 2021-08-11 VITALS
BODY MASS INDEX: 34.02 KG/M2 | DIASTOLIC BLOOD PRESSURE: 77 MMHG | HEIGHT: 71 IN | SYSTOLIC BLOOD PRESSURE: 139 MMHG | HEART RATE: 77 BPM | WEIGHT: 243 LBS | TEMPERATURE: 97.9 F

## 2021-08-11 DIAGNOSIS — L98.9 SKIN LESION OF HAND: Primary | ICD-10-CM

## 2021-08-11 PROCEDURE — 99244 OFF/OP CNSLTJ NEW/EST MOD 40: CPT | Performed by: SURGERY

## 2021-08-11 RX ORDER — SODIUM CHLORIDE 0.9 % (FLUSH) 0.9 %
10 SYRINGE (ML) INJECTION PRN
Status: CANCELLED | OUTPATIENT
Start: 2021-08-11

## 2021-08-11 RX ORDER — SODIUM CHLORIDE 9 MG/ML
25 INJECTION, SOLUTION INTRAVENOUS PRN
Status: CANCELLED | OUTPATIENT
Start: 2021-08-11

## 2021-08-11 RX ORDER — SODIUM CHLORIDE 0.9 % (FLUSH) 0.9 %
10 SYRINGE (ML) INJECTION EVERY 12 HOURS SCHEDULED
Status: CANCELLED | OUTPATIENT
Start: 2021-08-11

## 2021-08-11 RX ORDER — SODIUM CHLORIDE 9 MG/ML
INJECTION, SOLUTION INTRAVENOUS CONTINUOUS
Status: CANCELLED | OUTPATIENT
Start: 2021-08-11

## 2021-08-11 ASSESSMENT — ENCOUNTER SYMPTOMS
ABDOMINAL PAIN: 0
DIARRHEA: 0
DIARRHEA: 0
VOMITING: 0
PHOTOPHOBIA: 0
CONSTIPATION: 0
ABDOMINAL DISTENTION: 0
COLOR CHANGE: 1
SORE THROAT: 0
EYE REDNESS: 0
WHEEZING: 0
TROUBLE SWALLOWING: 0
COUGH: 0
VOMITING: 0
CONSTIPATION: 0
ABDOMINAL PAIN: 0
EYE PAIN: 0
NAUSEA: 0
PHOTOPHOBIA: 0
SHORTNESS OF BREATH: 0
SORE THROAT: 0
BLOOD IN STOOL: 0
NAUSEA: 0
SHORTNESS OF BREATH: 0
COUGH: 0
BACK PAIN: 0

## 2021-08-11 NOTE — PROGRESS NOTES
I updated Dr Alon Gillespie of patient  history of CAD and angioplasty with stent from 2001, and that he had missed his yearly follow up visit with Dr Kurt Parra. He sees his PCP every 6 months, and last office visit  noted in epic was 2-2021. He denies any cardiac complaints or symptoms. No new orders given.
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
## 2021-08-11 NOTE — PROGRESS NOTES
Matt PRE-ADMISSION TESTING INSTRUCTIONS    The Preadmission Testing patient is instructed accordingly using the following criteria (check applicable):    ARRIVAL INSTRUCTIONS:  [x] Parking the day of Surgery is located in the Main Entrance lot. Upon entering the door, make an immediate right to the surgery reception desk    [x] Bring photo ID and insurance card    [] Bring in a copy of Living will or Durable Power of  papers. [x] Please be sure to arrange for responsible adult to provide transportation to and from the hospital    [x] Please arrange for responsible adult to be with you for the 24 hour period post procedure due to having anesthesia      GENERAL INSTRUCTIONS:    [x] Nothing by mouth after midnight, including gum, candy, mints or water    [x] You may brush your teeth, but do not swallow any water    [x] Take medications as instructed with 1-2 oz of water    [x] Stop herbal supplements and vitamins 5 days prior to procedure    [] Follow preop dosing of blood thinners per physician instructions    [] Take 1/2 dose of evening insulin, but no insulin after midnight    [] No oral diabetic medications after midnight    [] If diabetic and have low blood sugar or feel symptomatic, take 1-2oz apple juice only    [] Bring inhalers day of surgery    [] Bring C-PAP/ Bi-Pap day of surgery    [] Bring urine specimen day of surgery    [x] Shower or bath with soap, lather and rinse well, AM of Surgery, no lotion, powders or creams to surgical site    [] Follow bowel prep as instructed per surgeon    [x] No tobacco products within 24 hours of surgery     [x] No alcohol or illegal drug use within 24 hours of surgery.     [x] Jewelry, body piercing's, eyeglasses, contact lenses and dentures are not permitted into surgery (bring cases)      [] Please do not wear any nail polish, make up or hair products on the day of surgery    [x] You can expect a call the business day prior to procedure to notify you if your arrival time changes    [x] If you receive a survey after surgery we would greatly appreciate your comments    [] Parent/guardian of a minor must accompany their child and remain on the premises  the entire time they are under our care     [] Pediatric patients may bring favorite toy, blanket or comfort item with them    [] A caregiver or family member must remain with the patient during their stay if they are mentally handicapped, have dementia, disoriented or unable to use a call light or would be a safety concern if left unattended    [x] Please notify surgeon if you develop any illness between now and time of surgery (cold, cough, sore throat, fever, nausea, vomiting) or any signs of infections  including skin, wounds, and dental.    [x]  The Outpatient Pharmacy is available to fill your prescription here on your day of surgery, ask your preop nurse for details    [] Other instructions    EDUCATIONAL MATERIALS PROVIDED:    [] PAT Preoperative Education Packet/Booklet     [] Medication List    [] Transfusion bracelet applied with instructions    [] Shower with soap, lather and rinse well, and use CHG wipes provided the evening before surgery as instructed    [] Incentive spirometer with instructions        Have you been tested for COVID  No           Have you been told you were positive for COVID No  Have you had any known exposure to someone that is positive for COVID No  Do you have a cough                   No              Do you have shortness of breath No                 Do you have a sore throat            No                Are you having chills                    No                Are you having muscle aches. No                    Please come to the hospital wearing a mask and have your significant other wear a mask as well. Both of you should check your temperature before leaving to come here,  if it is 100 or higher please call [phone] for instruction.
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
## 2021-08-12 ENCOUNTER — ANESTHESIA (OUTPATIENT)
Dept: OPERATING ROOM | Age: 66
End: 2021-08-12
Payer: COMMERCIAL

## 2021-08-12 ENCOUNTER — ANESTHESIA EVENT (OUTPATIENT)
Dept: OPERATING ROOM | Age: 66
End: 2021-08-12
Payer: COMMERCIAL

## 2021-08-12 ENCOUNTER — HOSPITAL ENCOUNTER (OUTPATIENT)
Age: 66
Setting detail: OUTPATIENT SURGERY
Discharge: HOME | End: 2021-08-12
Attending: SURGERY | Admitting: SURGERY
Payer: COMMERCIAL

## 2021-08-12 VITALS
WEIGHT: 243 LBS | BODY MASS INDEX: 34.79 KG/M2 | DIASTOLIC BLOOD PRESSURE: 76 MMHG | HEART RATE: 52 BPM | SYSTOLIC BLOOD PRESSURE: 142 MMHG | OXYGEN SATURATION: 98 % | RESPIRATION RATE: 20 BRPM | HEIGHT: 70 IN | TEMPERATURE: 97.5 F

## 2021-08-12 VITALS — DIASTOLIC BLOOD PRESSURE: 56 MMHG | SYSTOLIC BLOOD PRESSURE: 98 MMHG | OXYGEN SATURATION: 100 %

## 2021-08-12 LAB — SARS-COV-2, NAAT: NOT DETECTED

## 2021-08-12 PROCEDURE — 12042 INTMD RPR N-HF/GENIT2.6-7.5: CPT | Performed by: SURGERY

## 2021-08-12 PROCEDURE — 88305 TISSUE EXAM BY PATHOLOGIST: CPT

## 2021-08-12 PROCEDURE — 7100000011 HC PHASE II RECOVERY - ADDTL 15 MIN: Performed by: SURGERY

## 2021-08-12 PROCEDURE — 7100000010 HC PHASE II RECOVERY - FIRST 15 MIN: Performed by: SURGERY

## 2021-08-12 PROCEDURE — 3600000012 HC SURGERY LEVEL 2 ADDTL 15MIN: Performed by: SURGERY

## 2021-08-12 PROCEDURE — 2580000003 HC RX 258: Performed by: NURSE ANESTHETIST, CERTIFIED REGISTERED

## 2021-08-12 PROCEDURE — 3700000001 HC ADD 15 MINUTES (ANESTHESIA): Performed by: SURGERY

## 2021-08-12 PROCEDURE — 6360000002 HC RX W HCPCS: Performed by: NURSE ANESTHETIST, CERTIFIED REGISTERED

## 2021-08-12 PROCEDURE — 3600000002 HC SURGERY LEVEL 2 BASE: Performed by: SURGERY

## 2021-08-12 PROCEDURE — 2500000003 HC RX 250 WO HCPCS: Performed by: SURGERY

## 2021-08-12 PROCEDURE — 6360000002 HC RX W HCPCS: Performed by: SURGERY

## 2021-08-12 PROCEDURE — 2709999900 HC NON-CHARGEABLE SUPPLY: Performed by: SURGERY

## 2021-08-12 PROCEDURE — 87635 SARS-COV-2 COVID-19 AMP PRB: CPT

## 2021-08-12 PROCEDURE — 11624 EXC S/N/H/F/G MAL+MRG 3.1-4: CPT | Performed by: SURGERY

## 2021-08-12 PROCEDURE — 3700000000 HC ANESTHESIA ATTENDED CARE: Performed by: SURGERY

## 2021-08-12 RX ORDER — FENTANYL CITRATE 50 UG/ML
INJECTION, SOLUTION INTRAMUSCULAR; INTRAVENOUS PRN
Status: DISCONTINUED | OUTPATIENT
Start: 2021-08-12 | End: 2021-08-12 | Stop reason: SDUPTHER

## 2021-08-12 RX ORDER — SODIUM CHLORIDE, SODIUM LACTATE, POTASSIUM CHLORIDE, CALCIUM CHLORIDE 600; 310; 30; 20 MG/100ML; MG/100ML; MG/100ML; MG/100ML
INJECTION, SOLUTION INTRAVENOUS CONTINUOUS PRN
Status: DISCONTINUED | OUTPATIENT
Start: 2021-08-12 | End: 2021-08-12 | Stop reason: SDUPTHER

## 2021-08-12 RX ORDER — HYDROCODONE BITARTRATE AND ACETAMINOPHEN 5; 325 MG/1; MG/1
1 TABLET ORAL
Status: DISCONTINUED | OUTPATIENT
Start: 2021-08-12 | End: 2021-08-12 | Stop reason: HOSPADM

## 2021-08-12 RX ORDER — SODIUM CHLORIDE 0.9 % (FLUSH) 0.9 %
10 SYRINGE (ML) INJECTION PRN
Status: DISCONTINUED | OUTPATIENT
Start: 2021-08-12 | End: 2021-08-12 | Stop reason: HOSPADM

## 2021-08-12 RX ORDER — SODIUM CHLORIDE 9 MG/ML
25 INJECTION, SOLUTION INTRAVENOUS PRN
Status: DISCONTINUED | OUTPATIENT
Start: 2021-08-12 | End: 2021-08-12 | Stop reason: HOSPADM

## 2021-08-12 RX ORDER — PROPOFOL 10 MG/ML
INJECTION, EMULSION INTRAVENOUS PRN
Status: DISCONTINUED | OUTPATIENT
Start: 2021-08-12 | End: 2021-08-12 | Stop reason: SDUPTHER

## 2021-08-12 RX ORDER — SODIUM CHLORIDE 9 MG/ML
INJECTION, SOLUTION INTRAVENOUS CONTINUOUS
Status: DISCONTINUED | OUTPATIENT
Start: 2021-08-12 | End: 2021-08-12 | Stop reason: HOSPADM

## 2021-08-12 RX ORDER — LIDOCAINE HYDROCHLORIDE AND EPINEPHRINE 10; 10 MG/ML; UG/ML
INJECTION, SOLUTION INFILTRATION; PERINEURAL PRN
Status: DISCONTINUED | OUTPATIENT
Start: 2021-08-12 | End: 2021-08-12 | Stop reason: ALTCHOICE

## 2021-08-12 RX ORDER — SODIUM CHLORIDE 0.9 % (FLUSH) 0.9 %
10 SYRINGE (ML) INJECTION EVERY 12 HOURS SCHEDULED
Status: DISCONTINUED | OUTPATIENT
Start: 2021-08-12 | End: 2021-08-12 | Stop reason: HOSPADM

## 2021-08-12 RX ADMIN — PROPOFOL 400 MG: 10 INJECTION, EMULSION INTRAVENOUS at 13:27

## 2021-08-12 RX ADMIN — Medication 2000 MG: at 13:25

## 2021-08-12 RX ADMIN — FENTANYL CITRATE 100 MCG: 50 INJECTION, SOLUTION INTRAMUSCULAR; INTRAVENOUS at 13:27

## 2021-08-12 RX ADMIN — PROPOFOL 200 MG: 10 INJECTION, EMULSION INTRAVENOUS at 13:45

## 2021-08-12 RX ADMIN — SODIUM CHLORIDE, POTASSIUM CHLORIDE, SODIUM LACTATE AND CALCIUM CHLORIDE: 600; 310; 30; 20 INJECTION, SOLUTION INTRAVENOUS at 13:18

## 2021-08-12 ASSESSMENT — PULMONARY FUNCTION TESTS
PIF_VALUE: 1

## 2021-08-12 ASSESSMENT — PAIN DESCRIPTION - PAIN TYPE
TYPE: SURGICAL PAIN

## 2021-08-12 ASSESSMENT — PAIN DESCRIPTION - PROGRESSION
CLINICAL_PROGRESSION: NOT CHANGED

## 2021-08-12 ASSESSMENT — PAIN DESCRIPTION - ORIENTATION
ORIENTATION: LEFT

## 2021-08-12 ASSESSMENT — PAIN DESCRIPTION - LOCATION
LOCATION: HAND

## 2021-08-12 ASSESSMENT — PAIN SCALES - GENERAL
PAINLEVEL_OUTOF10: 0

## 2021-08-12 NOTE — INTERVAL H&P NOTE
Update History & Physical    The patient's History and Physical was reviewed with the patient and I examined the patient. There was no change. The surgical site was confirmed by the patient and me. Plan: The risks, benefits, expected outcome, and alternative to the recommended procedure have been discussed with the patient. Patient understands and wants to proceed with the procedure.      Electronically signed by Corbin Castanon DO on 8/12/2021 at 12:53 PM
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
## 2021-08-12 NOTE — PROGRESS NOTES
PT ARRIVED IN PACU 2 FROM SURGERY REPORT RECEIVED ASSESSMENT AND VS OBTAINED NOURISHMENT GIVEN FAMILY CALLED TO BE WITH PT   1430 FAMILY AT BEDSIDE AND UPDATED PT MORE AWAKE TAKING PO DENIES PAIN  1450 DISCHARGE INSTRUCTIONS GIVEN AT THIS TIME , MULTIPLE QUESTIONS ANSWERED FROM BOTH PT AND WIFE , BOTH VERBALIZED UNDERSTANDING OF INSTRUCTIONS PAMPHLETS GIVEN  1515 PT UP TO RESTROOM WIFE WENT TO GET CAR

## 2021-08-12 NOTE — OP NOTE
Operative Note      Patient: Felix Trivedi  YOB: 1955  MRN: [id number]    Date of Procedure: 8/12/2021    Pre-Op Diagnosis: MALIGNANT LESION    Post-Op Diagnosis: Same       Procedure(s):  EXCISION MALIGNANT LESION LEFT HAND   And complex closure of the skin. Surgeon(s):  Nicolle Gomez DO    Assistant:   Resident: Nano Winston MD    Anesthesia: Monitor Anesthesia Care    Estimated Blood Loss (mL): Minimal    Complications: None    Specimens:   ID Type Source Tests Collected by Time Destination   A : MALIGNANT LESION LEFT HAND Tissue Tissue SURGICAL PATHOLOGY Nicolle Gomez DO 8/12/2021 1338        Implants:  * No implants in log *      Drains: * No LDAs found *    Findings: 3.1 x 6.5 cm lesion    Detailed Description of Procedure:     BRIEF HISTORY: Felix Trivedi is a 61 y.o.  male presenting with rapid grown hand lesion. I discussed \"Excision of left hand lesion\" with the patient including the procedure, benefits, risks and alternatives, and he agreed. PROCEDURE IN DETAIL: The patient was taken to the operative suite and was placed on the table in the supine position. MAC anesthesia was administered. The left hand and arm was prepped with Chloraprep and draped in a sterile fashion. After marking an incision along Toby's lines, the skin was opened with a 15-blade scalpel, and the incision was carried down through the dermis and subcutaneous tissue using the electrocautery. Along the way, any bleeding points were cauterized. Skin flaps were raised superiorly, inferiorly, medially and laterally, and then the left hand skin was grasped. The electrocautery and scissors were used to excise the lesion. Along the way, any bleeding points were cauterized. The lesion was completely excised from the surrounding tissue. The cavity was inspected for bleeding, and any bleeding points were cauterized. The wound was then copiously irrigated with saline and suctioned dry.  Again, any bleeding points were cauterized. The skin was unable to be approximated and as such skin flaps had to be created on the lateral aspects of the incision. This was undermined approximately a centimeter and a half circumferentially at the dermal subcutaneous fat junction. The dermis was reapproximated with 3-0 Monocryl in an interrupted fashion. And the skin was closed with 3-0 nylon in a vertical mattress fashion interrupted. The hand was wrapped with Kerlix and Ace. . The patient tolerated the procedure well. Sponge, needle and instrument counts were correct. The patient was awakened and sent to the post-anesthesia care unit in stable condition. Electronically signed by Pauline Jean MD on 8/12/2021 at 2:55 PM    Attestation:    I was present during the procedure and participated in all castellano aspects.     Arnold Melendez DO  08/17/21  9:01 AM

## 2021-08-13 ENCOUNTER — TELEPHONE (OUTPATIENT)
Dept: SURGERY | Age: 66
End: 2021-08-13

## 2021-08-13 NOTE — ANESTHESIA POSTPROCEDURE EVALUATION
Department of Anesthesiology  Postprocedure Note    Patient: Neri Hamilton  MRN: [id number]  YOB: 1955  Date of evaluation: 8/13/2021  Time:  6:08 PM     Procedure Summary     Date: 08/12/21 Room / Location: [facility] OR 10 / [facility]    Anesthesia Start: 0430 Anesthesia Stop: 1416    Procedure: EXCISION MALIGNANT LESION LEFT HAND (Left Hand) Diagnosis: (MALIGNANT LESION)    Surgeons: Dianna Marquez DO Responsible Provider: Sabiha Juan DO    Anesthesia Type: MAC ASA Status: 3          Anesthesia Type: MAC    Ravinder Phase I: Ravinder Score: 10    Ravinder Phase II: Ravinder Score: 10    Last vitals: Reviewed and per EMR flowsheets.        Anesthesia Post Evaluation    Patient location during evaluation: PACU  Patient participation: complete - patient participated  Level of consciousness: awake and alert  Airway patency: patent  Nausea & Vomiting: no nausea and no vomiting  Complications: no  Cardiovascular status: hemodynamically stable  Respiratory status: acceptable  Hydration status: euvolemic

## 2021-08-13 NOTE — TELEPHONE ENCOUNTER
Received a call from the patient's wife who stated that he is having diarrhea after the procedure. She wants to know if this is normal after the procedure. The wife stated his hand is swollen a bit. He is drinking and eating well. No fever. MA told her that the patient just had surgery yesterday, he needs to take it easy and if they symptoms get worse, call our office back. The wife verbalized understanding.   Electronically signed by [id number] on 8/13/2021 at 10:46 AM

## 2021-08-23 ENCOUNTER — OFFICE VISIT (OUTPATIENT)
Dept: SURGERY | Age: 66
End: 2021-08-23

## 2021-08-23 VITALS
SYSTOLIC BLOOD PRESSURE: 130 MMHG | HEART RATE: 60 BPM | WEIGHT: 249.6 LBS | TEMPERATURE: 97 F | BODY MASS INDEX: 35.81 KG/M2 | DIASTOLIC BLOOD PRESSURE: 67 MMHG

## 2021-08-23 DIAGNOSIS — Z09 POSTOPERATIVE EXAMINATION: Primary | ICD-10-CM

## 2021-08-23 PROCEDURE — 99024 POSTOP FOLLOW-UP VISIT: CPT | Performed by: SURGERY

## 2021-08-23 NOTE — PROGRESS NOTES
Patient presents for postoperative visit. He has no complaints. On exam incisions intact and healing appropriately. We removed approximately 50% of his sutures today. Pathology is consistent with squamous carcinoma. Margins are negative. Return near the end of the week for the removal of the remainder of sutures, follow-up in office in 2 weeks.

## 2021-08-26 ENCOUNTER — NURSE ONLY (OUTPATIENT)
Dept: SURGERY | Age: 66
End: 2021-08-26

## 2021-08-26 DIAGNOSIS — Z48.02 VISIT FOR SUTURE REMOVAL: Primary | ICD-10-CM

## 2021-08-26 NOTE — PROGRESS NOTES
Pt presented for remaining suture removal. MA removed remaining sutures pt tolerated well.  Instructed to call office if he has any concerns  Electronically signed by Leticia Vo MA on 8/26/21 at 12:56 PM EDT

## 2021-08-30 NOTE — PROGRESS NOTES
Fernie Delgado (:  1955) is a 61 y.o. male,Established patient, here for evaluation of the following chief complaint(s): Annual Exam         ASSESSMENT/PLAN:  1. Well adult exam  Reviewed preventive care. Pt wants flu vaccine but we don't have today. Not interested in COVID. Will not get medicare next year as he is going to continue to work so will not need an AWV. Healthcare decision makers updated. Pt already has POA and living will - encouraged to bring them in. Pt ed on exercise and diet. 2. Essential hypertension  Assessment & Plan:  Well controlled on current meds. Labs ordered today- microalbumin  Orders:  -     Comprehensive Metabolic Panel; Future  -     Microalbumin / Creatinine Urine Ratio; Future  3. Mixed hyperlipidemia  Assessment & Plan:  Cont on current meds. Lipids, cmp ordered today. Orders:  -     Lipid Panel; Future  -     Comprehensive Metabolic Panel; Future  4. Coronary artery disease involving native coronary artery of native heart without angina pectoris  Assessment & Plan:  Pt encouraged to follow up with cardiology as he has not seen them in several years. Orders:  -     Lipid Panel; Future  5. Prediabetes  Assessment & Plan:   HbA1c ordered today. If 6.0 or over will discuss meds and/or referral to DM counseling. Orders:  -     Hemoglobin A1C; Future  6. Squamous cell carcinoma of left hand  -     Hanh Wang MD,  Dermatology, [facility]  7. 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 Return in about 6 months (around 2022) for HTN. Subjective   SUBJECTIVE/OBJECTIVE:  HPI     Reviewed preventive care. Pt wants flu vaccine but we don't have today. Not interested in COVID. Will not get medicare next year as he is going to continue to work so will not need an AWV. Healthcare decision makers updated.   Pt already has POA and living will - encouraged to bring them in. Pt ed on exercise and diet. Check labs today    Well visit today       SCC on hand - needs to see derm to look at rest of his body. Healing well today. He did not get a flu shot last year. Not getting COVID vaccine. Lipids - last lab in Jan.  Repeat today. meds - takes as directed    HTN with CAD - labs in Jan.  Repeat today. Has not seen cardiology sicne 2019. Had the event when he was moving his lawn and this happened 2 weeks later. This was about 20 years ago. Had chest pressure. No feelings like that recently. If he drinks to much pop he gets gas but burping hleps this. preDM - labs in [place]. Repeat today    1+ edema LE    AWV in March 2022    Pees once in the middle of the night. Patient Active Problem List    Diagnosis Date Noted    Prediabetes 09/01/2021     Pt not follow DM diet. 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  Chronic pain of both knees 04/22/2019     OA        Generalized anxiety disorder 10/15/2018    Mixed hyperlipidemia 01/26/2016     Pt takes meds as directed.  Essential hypertension 01/26/2016     Pt takes meds as directed. Does not follow low sodium diet.  Hypogonadotropic hypogonadism ([address] 02/16/2014    Carpal tunnel syndrome 01/19/2014    ED (erectile dysfunction) 01/08/2014    Asthma 01/08/2014     Intermittent.  CAD (coronary artery disease)      2 stents placed in 2001. cardiolgist Dr Eris Andrews. Last stress test 2019 - WNL. Did not have an MI but needed stents placed in 2001. No events since that time. Review of Systems   Constitutional: Negative for activity change, appetite change, chills, diaphoresis, fatigue, fever and unexpected weight change. Respiratory: Negative for cough, chest tightness, shortness of breath and wheezing. Cardiovascular: Negative for chest pain, palpitations and leg swelling.    Gastrointestinal: Negative for abdominal pain, blood in stool, constipation, diarrhea and nausea. Genitourinary: Negative for difficulty urinating and frequency. Urinates once at night   Musculoskeletal: Positive for arthralgias. Negative for gait problem and myalgias. Skin: Negative for rash. Neurological: Negative for dizziness, facial asymmetry, speech difficulty, weakness and numbness. Objective   Physical Exam  Vitals and nursing note reviewed. Constitutional:       Appearance: Normal appearance. He is well-developed. He is not ill-appearing or toxic-appearing. HENT:      Head: Normocephalic and atraumatic. Eyes:      General: No scleral icterus. Right eye: No discharge. Left eye: No discharge. Neck:      Thyroid: No thyromegaly. Vascular: No carotid bruit. Cardiovascular:      Rate and Rhythm: Normal rate and regular rhythm. Heart sounds: Normal heart sounds, S1 normal and S2 normal. No murmur heard. No friction rub. No gallop. Pulmonary:      Effort: Pulmonary effort is normal. No respiratory distress. Breath sounds: Normal breath sounds and air entry. No decreased breath sounds, wheezing or rales. Abdominal:      Palpations: Abdomen is soft. Tenderness: There is no abdominal tenderness. Musculoskeletal:      Cervical back: Neck supple. Right lower le+ Edema present. Left lower le+ Edema present. Lymphadenopathy:      Cervical: No cervical adenopathy. Skin:     General: Skin is warm and dry. Neurological:      General: No focal deficit present. Mental Status: He is alert and oriented to person, place, and time. Gait: Gait normal.   Psychiatric:         Attention and Perception: Attention normal.         Mood and Affect: Mood and affect normal.         Speech: Speech normal.         Behavior: Behavior normal. Behavior is cooperative. Thought Content:  Thought content normal.         Cognition and Memory: Cognition and memory normal. Judgment: Judgment normal.                  An electronic signature was used to authenticate this note.     --Nabil Son MD

## 2021-08-31 ENCOUNTER — OFFICE VISIT (OUTPATIENT)
Dept: FAMILY MEDICINE CLINIC | Age: 66
End: 2021-08-31
Payer: COMMERCIAL

## 2021-08-31 VITALS
SYSTOLIC BLOOD PRESSURE: 131 MMHG | OXYGEN SATURATION: 98 % | TEMPERATURE: 98.8 F | HEIGHT: 70 IN | BODY MASS INDEX: 35.36 KG/M2 | WEIGHT: 247 LBS | DIASTOLIC BLOOD PRESSURE: 68 MMHG | RESPIRATION RATE: 16 BRPM | HEART RATE: 59 BPM

## 2021-08-31 DIAGNOSIS — I25.10 CORONARY ARTERY DISEASE INVOLVING NATIVE CORONARY ARTERY OF NATIVE HEART WITHOUT ANGINA PECTORIS: ICD-10-CM

## 2021-08-31 DIAGNOSIS — Z00.00 WELL ADULT EXAM: Primary | ICD-10-CM

## 2021-08-31 DIAGNOSIS — C40.12: ICD-10-CM

## 2021-08-31 DIAGNOSIS — E78.2 MIXED HYPERLIPIDEMIA: ICD-10-CM

## 2021-08-31 DIAGNOSIS — C44.629 SQUAMOUS CELL CARCINOMA OF LEFT HAND: ICD-10-CM

## 2021-08-31 DIAGNOSIS — I10 ESSENTIAL HYPERTENSION: ICD-10-CM

## 2021-08-31 DIAGNOSIS — R73.03 PREDIABETES: ICD-10-CM

## 2021-08-31 PROCEDURE — 99396 PREV VISIT EST AGE 40-64: CPT | Performed by: FAMILY MEDICINE

## 2021-09-01 DIAGNOSIS — E78.2 MIXED HYPERLIPIDEMIA: ICD-10-CM

## 2021-09-01 DIAGNOSIS — I10 ESSENTIAL HYPERTENSION: ICD-10-CM

## 2021-09-01 DIAGNOSIS — R73.03 PREDIABETES: Primary | ICD-10-CM

## 2021-09-01 DIAGNOSIS — I25.10 CORONARY ARTERY DISEASE INVOLVING NATIVE CORONARY ARTERY OF NATIVE HEART WITHOUT ANGINA PECTORIS: ICD-10-CM

## 2021-09-01 ASSESSMENT — ENCOUNTER SYMPTOMS
NAUSEA: 0
ABDOMINAL PAIN: 0
CHEST TIGHTNESS: 0
SHORTNESS OF BREATH: 0
CONSTIPATION: 0
COUGH: 0
WHEEZING: 0
DIARRHEA: 0
BLOOD IN STOOL: 0

## 2021-09-08 ENCOUNTER — OFFICE VISIT (OUTPATIENT)
Dept: SURGERY | Age: 66
End: 2021-09-08
Payer: COMMERCIAL

## 2021-09-08 VITALS
DIASTOLIC BLOOD PRESSURE: 70 MMHG | HEIGHT: 71 IN | BODY MASS INDEX: 34.72 KG/M2 | WEIGHT: 248 LBS | HEART RATE: 60 BPM | SYSTOLIC BLOOD PRESSURE: 130 MMHG | RESPIRATION RATE: 18 BRPM | TEMPERATURE: 97.3 F

## 2021-09-08 DIAGNOSIS — C44.629 SQUAMOUS CELL CARCINOMA OF LEFT HAND: ICD-10-CM

## 2021-09-08 DIAGNOSIS — C44.509 SKIN CANCER OF TRUNK: Primary | ICD-10-CM

## 2021-09-08 PROCEDURE — 12032 INTMD RPR S/A/T/EXT 2.6-7.5: CPT | Performed by: SURGERY

## 2021-09-08 PROCEDURE — 99213 OFFICE O/P EST LOW 20 MIN: CPT | Performed by: SURGERY

## 2021-09-08 PROCEDURE — 11602 EXC TR-EXT MAL+MARG 1.1-2 CM: CPT | Performed by: SURGERY

## 2021-09-09 ASSESSMENT — ENCOUNTER SYMPTOMS
COLOR CHANGE: 1
NAUSEA: 0
WHEEZING: 0
BLOOD IN STOOL: 0
SORE THROAT: 0
EYE REDNESS: 0
PHOTOPHOBIA: 0
ABDOMINAL PAIN: 0
SHORTNESS OF BREATH: 0
BACK PAIN: 0
CONSTIPATION: 0
DIARRHEA: 0
VOMITING: 0
COUGH: 0

## 2021-09-09 NOTE — PROGRESS NOTES
Marital status:      Spouse name: Not on file    Number of children: Not on file    Years of education: Not on file    Highest education level: Not on file   Occupational History    Not on file   Tobacco Use    Smoking status: Former Smoker     Packs/day: 1.00     Years: 30.00     Pack years: 30.00     Quit date:      Years since quittin.7    Smokeless tobacco: Former User     Quit date: 2001   Vaping Use    Vaping Use: Never used   Substance and Sexual Activity    Alcohol use: Yes     Alcohol/week: 1.0 standard drinks     Types: 1 Cans of beer per week     Comment: occ    Drug use: No    Sexual activity: Not on file   Other Topics Concern    Not on file   Social History Narrative    Not on file     Social Determinants of Health     Financial Resource Strain: Low Risk     Difficulty of Paying Living Expenses: Not hard at all   Food Insecurity: No Food Insecurity    Worried About Running Out of Food in the Last Year: Never true    [address] in the Last Year: Never true   Transportation Needs:     Lack of Transportation (Medical):  Lack of Transportation (Non-Medical):    Physical Activity:     Days of Exercise per Week:     Minutes of Exercise per Session:    Stress:     Feeling of Stress :    Social Connections:     Frequency of Communication with Friends and Family:     Frequency of Social Gatherings with Friends and Family:     Attends L[employer] Services:     Active Member of Clubs or Organizations:     Attends Club or Organization Meetings:     Marital Status:    Intimate Partner Violence:     Fear of Current or Ex-Partner:     Emotionally Abused:     Physically Abused:     Sexually Abused:        Review of Systems:   Review of Systems   Constitutional: Negative for chills and fever. HENT: Negative for ear pain, nosebleeds, sore throat and tinnitus. Eyes: Negative for photophobia and redness.    Respiratory: Negative for cough, shortness of breath and wheezing. Cardiovascular: Negative for chest pain and palpitations. Gastrointestinal: Negative for abdominal pain, blood in stool, constipation, diarrhea, nausea and vomiting. Endocrine: Negative for polydipsia. Genitourinary: Negative for dysuria, hematuria and urgency. Musculoskeletal: Negative for back pain and neck pain. Skin: Positive for color change. Negative for rash. Neurological: Negative for dizziness, tremors and seizures. Hematological: Does not bruise/bleed easily. All other systems reviewed and are negative. Physical Exam:  Vitals:    09/08/21 1513   BP: 130/70   Pulse: 60   Resp: 18   Temp: 97.3 °F (36.3 °C)   TempSrc: Temporal   Weight: 248 lb (112.5 kg)   Height: 5' 11\" (1.803 m)       General: Well nourished, well developed, no acute distress  Eyes:  PERRL   Conjunctiva unremarkable   ENT:  TM's intact bilaterally, no effusion   Nasal:  Nomucosal edema     No nasal drainage   Oral:  mucosa moist and pink   Neck:  Supple   No palpable cervical lymphoadenopathy   Thyroid without mass or enlargement  Resp: Lungs CTAB   Equal and adequate air exchange without accessory muscle use   No rales, rhonchi or wheeze  CV: S1S2 RRR   No murmur   Intact distal pulses   No edema  GI: Abdomen Soft, nontender, non distended   Normoactive bowel sounds   No palpable hepatosplenomegaly  MS: Physiologic ROM of all extremities    Intact distal pulses   No clubbing or cyanosis   Skin left hand incision is completely healed. He has good range of motion in all digits. On the left upper back there is approximately 1.3 cm maculopapular erythematous centrally ulcerated lesion, suspicious for carcinoma. Warmand dry; no rash or lesion  Neuro: Alert and oriented; normal and intact dtr's   Psych: Euthymic mood, congruent affect      No results found. Assessment/Plan:  3 61-year-old male with history of squamous carcinoma the hand.   Now presents with a similar appearing suspicious lesion of his left upper back. We will plan for excision today. Procedure note  Preop diagnosis: Malignant lesion of back  Postoperative gnosis: Same procedure: Excision malignant lesion of back, intermediate closure    Surgeon: Estefanía Ivory  EBL: Minimal  Description of procedure  Skin is prepped and draped in standard fashion. Lidocaine 1% is administered for local anesthesia throughout all areas of the operative field. Following this elliptical incision was made surrounding the lesion measuring 1.6 x 4.3 cm. Dissection is carried down to the fatty tissues of the back and lesion is removed. Wound is irrigated. Is then closed layer of interrupted 3-0 Monocryl followed by a layer of interrupted 4-0 nylon sutures. Dressing is applied. Patient tolerated procedure well.         Electronically signed by Jairon Juárez DO on 9/9/21 at 9:24 AM EDT

## 2021-09-20 ENCOUNTER — OFFICE VISIT (OUTPATIENT)
Dept: SURGERY | Age: 66
End: 2021-09-20
Payer: COMMERCIAL

## 2021-09-20 VITALS
RESPIRATION RATE: 20 BRPM | HEIGHT: 71 IN | SYSTOLIC BLOOD PRESSURE: 125 MMHG | HEART RATE: 63 BPM | DIASTOLIC BLOOD PRESSURE: 73 MMHG | TEMPERATURE: 97 F | WEIGHT: 246 LBS | OXYGEN SATURATION: 95 % | BODY MASS INDEX: 34.44 KG/M2

## 2021-09-20 DIAGNOSIS — C44.629 SQUAMOUS CELL CARCINOMA OF LEFT HAND: Primary | ICD-10-CM

## 2021-09-20 DIAGNOSIS — D04.5 SQUAMOUS CELL CARCINOMA IN SITU (SCCIS) OF SKIN OF BACK: ICD-10-CM

## 2021-09-20 PROCEDURE — 99213 OFFICE O/P EST LOW 20 MIN: CPT | Performed by: SURGERY

## 2021-09-23 NOTE — PROGRESS NOTES
Patient presents for follow-up. Incision is healing appropriately. Remove his sutures. 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 There are no other suspicious lesions on his trunk identified. He is welcome to follow with me as needed.

## 2021-11-22 DIAGNOSIS — E78.2 MIXED HYPERLIPIDEMIA: ICD-10-CM

## 2021-11-22 DIAGNOSIS — I25.10 CORONARY ARTERY DISEASE INVOLVING NATIVE CORONARY ARTERY OF NATIVE HEART WITHOUT ANGINA PECTORIS: Chronic | ICD-10-CM

## 2021-11-22 RX ORDER — AZITHROMYCIN 250 MG/1
TABLET, FILM COATED ORAL
COMMUNITY
Start: 2021-11-21 | End: 2022-02-22

## 2021-11-22 RX ORDER — SIMVASTATIN 40 MG
TABLET ORAL
Qty: 90 TABLET | Refills: 1 | Status: SHIPPED
Start: 2021-11-22 | End: 2022-02-22 | Stop reason: SDUPTHER

## 2021-11-22 RX ORDER — SIMVASTATIN 40 MG
TABLET ORAL
Qty: 90 TABLET | Refills: 1 | Status: SHIPPED
Start: 2021-11-22 | End: 2021-11-22 | Stop reason: SDUPTHER

## 2021-11-22 NOTE — TELEPHONE ENCOUNTER
----- Message from Mónica Gutierres sent at 11/22/2021  9:28 AM EST -----  Subject: Refill Request    QUESTIONS  Name of Medication? simvastatin (ZOCOR) 40 MG tablet  Patient-reported dosage and instructions? 1 x a day  How many days do you have left? 0  Preferred Pharmacy? [facility]  Pharmacy phone number (if available)? [phone]  ---------------------------------------------------------------------------  --------------,  Name of Medication? metoprolol tartrate (LOPRESSOR) 25 MG tablet  Patient-reported dosage and instructions? 2x a day  How many days do you have left? 2  Preferred Pharmacy? [facility] [place] phone number (if available)? [phone]  ---------------------------------------------------------------------------  --------------  CALL BACK INFO  What is the best way for the office to contact you? OK to leave message on   voicemail  Preferred Call Back Phone Number?  [phone]

## 2021-11-23 DIAGNOSIS — E78.2 MIXED HYPERLIPIDEMIA: ICD-10-CM

## 2021-11-23 DIAGNOSIS — I25.10 CORONARY ARTERY DISEASE INVOLVING NATIVE CORONARY ARTERY OF NATIVE HEART WITHOUT ANGINA PECTORIS: Chronic | ICD-10-CM

## 2021-11-23 DIAGNOSIS — I10 ESSENTIAL HYPERTENSION: Chronic | ICD-10-CM

## 2021-11-23 RX ORDER — SIMVASTATIN 40 MG
TABLET ORAL
Qty: 90 TABLET | Refills: 1 | Status: CANCELLED | OUTPATIENT
Start: 2021-11-23

## 2021-11-23 NOTE — TELEPHONE ENCOUNTER
----- Message from Liliam Casanova sent at 11/22/2021  9:28 AM EST -----  Subject: Refill Request    QUESTIONS  Name of Medication? simvastatin (ZOCOR) 40 MG tablet  Patient-reported dosage and instructions? 1 x a day  How many days do you have left? 0  Preferred Pharmacy? [facility]  Pharmacy phone number (if available)? [phone]  ---------------------------------------------------------------------------  --------------,  Name of Medication? metoprolol tartrate (LOPRESSOR) 25 MG tablet  Patient-reported dosage and instructions? 2x a day  How many days do you have left? 2  Preferred Pharmacy? [facility] phone number (if available)? [phone]  ---------------------------------------------------------------------------  --------------  CALL BACK INFO  What is the best way for the office to contact you? OK to leave message on   voicemail  Preferred Call Back Phone Number?  [phone]

## 2022-01-28 ENCOUNTER — TELEPHONE (OUTPATIENT)
Dept: SURGERY | Age: 67
End: 2022-01-28

## 2022-01-28 NOTE — TELEPHONE ENCOUNTER
Received a voice message from the patient's wife who is having a question about the bill they received from last year. Attempted to call back. No answer, left message on the phone.   Electronically signed by Paola Howell on 1/28/2022 at 1:15 PM

## 2022-02-21 NOTE — PROGRESS NOTES
Jj Walsh (:  1955) is a 74 y.o. male,Established patient, here for evaluation of the following chief complaint(s):  No chief complaint on file. ASSESSMENT/PLAN:  1. Mixed hyperlipidemia  -     Lipid Panel; Future  -     Comprehensive Metabolic Panel; Future  -     simvastatin (ZOCOR) 40 MG tablet; TAKE ONE TABLET BY MOUTH  NIGHTLY, Disp-90 tablet, R-1Normal  Cont on current meds. Labs ordered. 2. Prediabetes  -     Hemoglobin A1C; Future  -     Microalbumin / Creatinine Urine Ratio; Future  -     [address], DPM, Podiatry, [employer] with dietary changes. Labs ordered. 3. Coronary artery disease involving native coronary artery of native heart without angina pectoris  -     metoprolol tartrate (LOPRESSOR) 25 MG tablet; Take 1 tablet by mouth 2 times daily, Disp-180 tablet, R-1Normal  -     simvastatin (ZOCOR) 40 MG tablet; TAKE ONE TABLET BY MOUTH  NIGHTLY, Disp-90 tablet, R-1Normal  Stable on current meds. Will get ECG at next visit. No need for stress test or echo per ACC guidelines. 4. Essential hypertension  -     Comprehensive Metabolic Panel; Future  -     metoprolol tartrate (LOPRESSOR) 25 MG tablet; Take 1 tablet by mouth 2 times daily, Disp-180 tablet, R-1Normal  Stable on current meds. Labs ordered. 5. Hypogonadotropic hypogonadism (HCC)  Stable. Pt on no meds currently. 6. 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 So far stable. Will cont to watch. 7. Tinea pedis of both feet  -     clotrimazole (LOTRIMIN AF) 1 % cream; Apply topically 2 times daily to affected area, Disp-40 g, R-0, Normal  Will treat. Pt to podiatry because of toenails. From [employer] of Cardiology Guidelines. 6.1. Clinical Evaluation, Echocardiography During  Routine, Periodic Follow-Up: Recommendations  CLASS I  1.  Patients with SIHD should receive periodic follow-up, at least annually, that includes all of the following (Level of Evidence: C):  a. Assessment of symptoms and clinical function;  b. Surveillance for complications of SIHD, including heart failure  and arrhythmias;  c. Monitoring of cardiac risk factors; and  d. Assessment of the adequacy of and adherence to recommended  lifestyle changes and medical therapy. 2. Assessment of LVEF and segmental wall motion by echocardiography or radionuclide imaging is recommended in patients with new  or worsening heart failure or evidence of intervening MI by history or  ECG. (Level of Evidence: C)  CLASS IIb  1. Periodic screening for important comorbidities that are prevalent in  patients with SIHD, including diabetes mellitus, depression, and  CKD, might be reasonable. (Level of Evidence: C)  2. A resting 12-lead ECG at 1-year or longer intervals between studies  in patients with stable symptoms might be reasonable. (Level of  Evidence: C)  CLASS III: No Benefit  1. Measurement of LV function with a technology such as echocardiography or radionuclide imaging is not recommended for routine  periodic reassessment of patients who have not had a change in  clinical status or who are at low risk of adverse cardiovascular  events (117). (Level of Evidence: C)    Return in about 6 months (around 8/22/2022) for preDM, HTN. Subjective   SUBJECTIVE/OBJECTIVE:  HPI     Prev - AAA screening, vaccines? Does not want. Labs prior to next visit - scripts given. Hand is doing well. Has completely healed and no new lesions. HTN - on metoprolol, zocor, ASA. Has not seen the heart doctor. Wants me to do the stress here. Urination - gets up one time a night. 1 week ago got up 4 times and has not done that since. He is having issues with athletes foot. Has not seen podiatry. preDM - sugars? Check HbA1c today? Does not need today. Not checking his sugar at home. He got very sleepy when he used lots of syrup.       Thanksgiving had sinus infection - zpak cured him.  [place] - same thing. Had COVID. Did not get fever, just got very stuffed up. Review of Systems   Constitutional: Negative for activity change, appetite change, chills, diaphoresis, fatigue, fever and unexpected weight change. Respiratory: Negative for cough, chest tightness, shortness of breath and wheezing. Cardiovascular: Negative for chest pain, palpitations and leg swelling. Gastrointestinal: Negative for abdominal pain, blood in stool, constipation, diarrhea and nausea. Endocrine: Negative for cold intolerance, heat intolerance, polydipsia, polyphagia and polyuria. Genitourinary: Negative for difficulty urinating, frequency and urgency. Musculoskeletal: Negative for arthralgias, gait problem and myalgias. Skin: Negative for rash. Neurological: Negative for dizziness, seizures, facial asymmetry, speech difficulty, weakness, light-headedness, numbness and headaches. Psychiatric/Behavioral: Negative for dysphoric mood and sleep disturbance. The patient is not nervous/anxious. Objective   Physical Exam  Vitals and nursing note reviewed. Constitutional:       Appearance: Normal appearance. He is well-developed. He is not ill-appearing or toxic-appearing. HENT:      Head: Normocephalic and atraumatic. Eyes:      General: No scleral icterus. Right eye: No discharge. Left eye: No discharge. Neck:      Thyroid: No thyromegaly. Vascular: No carotid bruit. Cardiovascular:      Rate and Rhythm: Normal rate and regular rhythm. Pulses:           Dorsalis pedis pulses are 1+ on the right side and 1+ on the left side. Posterior tibial pulses are 1+ on the right side and 1+ on the left side. Heart sounds: Normal heart sounds, S1 normal and S2 normal. No murmur heard. No friction rub. No gallop. Pulmonary:      Effort: Pulmonary effort is normal. No respiratory distress. Breath sounds: Normal breath sounds and air entry.  No decreased breath sounds, wheezing or rales. Abdominal:      Palpations: Abdomen is soft. Tenderness: There is no abdominal tenderness. Musculoskeletal:      Cervical back: Neck supple. Right lower leg: No edema. Left lower leg: No edema. Right foot: Normal range of motion. No deformity, bunion, Charcot foot, foot drop or prominent metatarsal heads. Left foot: Normal range of motion. No deformity, bunion, Charcot foot, foot drop or prominent metatarsal heads. Feet:      Right foot:      Protective Sensation: 10 sites tested. 8 sites sensed. Skin integrity: Skin breakdown present. Toenail Condition: Right toenails are abnormally thick. Left foot:      Protective Sensation: 10 sites tested. 10 sites sensed. Skin integrity: Skin breakdown present. Toenail Condition: Left toenails are abnormally thick. Comments: Did not have feeling in right foot first two toes. Some athletes foot between toes - minimal.     Lymphadenopathy:      Cervical: No cervical adenopathy. Skin:     General: Skin is warm and dry. Neurological:      General: No focal deficit present. Mental Status: He is alert and oriented to person, place, and time. Gait: Gait normal.   Psychiatric:         Attention and Perception: Attention normal.         Mood and Affect: Mood and affect normal.         Speech: Speech normal.         Behavior: Behavior normal. Behavior is cooperative. Thought Content: Thought content normal.         Cognition and Memory: Cognition and memory normal.         Judgment: Judgment normal.              An electronic signature was used to authenticate this note.     --Stacy Bentley MD

## 2022-02-22 ENCOUNTER — OFFICE VISIT (OUTPATIENT)
Dept: FAMILY MEDICINE CLINIC | Age: 67
End: 2022-02-22
Payer: COMMERCIAL

## 2022-02-22 VITALS
SYSTOLIC BLOOD PRESSURE: 135 MMHG | WEIGHT: 247.4 LBS | BODY MASS INDEX: 34.64 KG/M2 | OXYGEN SATURATION: 98 % | TEMPERATURE: 97.8 F | HEIGHT: 71 IN | DIASTOLIC BLOOD PRESSURE: 64 MMHG | HEART RATE: 59 BPM

## 2022-02-22 DIAGNOSIS — B35.3 TINEA PEDIS OF BOTH FEET: ICD-10-CM

## 2022-02-22 DIAGNOSIS — E23.0 HYPOGONADOTROPIC HYPOGONADISM (HCC): Chronic | ICD-10-CM

## 2022-02-22 DIAGNOSIS — I25.10 CORONARY ARTERY DISEASE INVOLVING NATIVE CORONARY ARTERY OF NATIVE HEART WITHOUT ANGINA PECTORIS: ICD-10-CM

## 2022-02-22 DIAGNOSIS — C40.12: ICD-10-CM

## 2022-02-22 DIAGNOSIS — R73.03 PREDIABETES: ICD-10-CM

## 2022-02-22 DIAGNOSIS — E78.2 MIXED HYPERLIPIDEMIA: ICD-10-CM

## 2022-02-22 DIAGNOSIS — I10 ESSENTIAL HYPERTENSION: ICD-10-CM

## 2022-02-22 PROCEDURE — 90471 IMMUNIZATION ADMIN: CPT | Performed by: FAMILY MEDICINE

## 2022-02-22 PROCEDURE — 99214 OFFICE O/P EST MOD 30 MIN: CPT | Performed by: FAMILY MEDICINE

## 2022-02-22 PROCEDURE — 90694 VACC AIIV4 NO PRSRV 0.5ML IM: CPT | Performed by: FAMILY MEDICINE

## 2022-02-22 RX ORDER — SIMVASTATIN 40 MG
TABLET ORAL
Qty: 90 TABLET | Refills: 1 | Status: SHIPPED
Start: 2022-02-22 | End: 2022-08-23 | Stop reason: SDUPTHER

## 2022-02-22 RX ORDER — CLOTRIMAZOLE 1 %
CREAM (GRAM) TOPICAL
Qty: 40 G | Refills: 0 | Status: SHIPPED | OUTPATIENT
Start: 2022-02-22 | End: 2022-03-01

## 2022-02-22 ASSESSMENT — PATIENT HEALTH QUESTIONNAIRE - PHQ9
SUM OF ALL RESPONSES TO PHQ QUESTIONS 1-9: 0
SUM OF ALL RESPONSES TO PHQ QUESTIONS 1-9: 0
2. FEELING DOWN, DEPRESSED OR HOPELESS: 0
SUM OF ALL RESPONSES TO PHQ QUESTIONS 1-9: 0
1. LITTLE INTEREST OR PLEASURE IN DOING THINGS: 0
SUM OF ALL RESPONSES TO PHQ9 QUESTIONS 1 & 2: 0
SUM OF ALL RESPONSES TO PHQ QUESTIONS 1-9: 0

## 2022-02-24 ASSESSMENT — ENCOUNTER SYMPTOMS
BLOOD IN STOOL: 0
CHEST TIGHTNESS: 0
SHORTNESS OF BREATH: 0
COUGH: 0
DIARRHEA: 0
CONSTIPATION: 0
WHEEZING: 0
NAUSEA: 0
ABDOMINAL PAIN: 0

## 2022-03-07 ENCOUNTER — OFFICE VISIT (OUTPATIENT)
Dept: PODIATRY | Age: 67
End: 2022-03-07
Payer: COMMERCIAL

## 2022-03-07 VITALS — BODY MASS INDEX: 34.58 KG/M2 | HEIGHT: 71 IN | WEIGHT: 247 LBS

## 2022-03-07 DIAGNOSIS — L85.3 XEROSIS CUTIS: ICD-10-CM

## 2022-03-07 DIAGNOSIS — E11.9 TYPE 2 DIABETES MELLITUS WITHOUT COMPLICATION, UNSPECIFIED WHETHER LONG TERM INSULIN USE (HCC): ICD-10-CM

## 2022-03-07 DIAGNOSIS — G60.8 HEREDITARY SENSORY NEUROPATHY: ICD-10-CM

## 2022-03-07 DIAGNOSIS — B35.1 TINEA UNGUIUM: Primary | ICD-10-CM

## 2022-03-07 PROCEDURE — 11721 DEBRIDE NAIL 6 OR MORE: CPT | Performed by: PODIATRIST

## 2022-03-07 PROCEDURE — 99203 OFFICE O/P NEW LOW 30 MIN: CPT | Performed by: PODIATRIST

## 2022-03-07 RX ORDER — AMMONIUM LACTATE 12 G/100G
LOTION TOPICAL
Qty: 400 G | Refills: 2 | Status: SHIPPED | OUTPATIENT
Start: 2022-03-07

## 2022-03-07 NOTE — LETTER
Shayy Barahona MD   [address] [facility]. [address] [place] [address]  [phone]     Patient: Iliana Shelley  YOB: 1955  Date of Visit: 3/8/2022     Dear Shayy Barahona MD    Thank you for referring Iliana Shelley to me for evaluation. Mona Khoury was seen today foot and ankle exam.  Did perform formal debridement all toenails today. Tolerated procedure well. I will follow-up in office 2 months. Once again, thank you very much for this kind referral and allowing me to participate in the care of this patient. If you have questions, please do not hesitate to call me.     Sincerely,        SIMON Blackwell [place] [id number]  Phone: [phone]  Fax: [phone]
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
## 2022-08-19 DIAGNOSIS — R73.03 PREDIABETES: ICD-10-CM

## 2022-08-19 DIAGNOSIS — I10 ESSENTIAL HYPERTENSION: ICD-10-CM

## 2022-08-19 DIAGNOSIS — E78.2 MIXED HYPERLIPIDEMIA: ICD-10-CM

## 2022-08-19 LAB
ALBUMIN SERPL-MCNC: 4.2 G/DL (ref 3.5–5.2)
ALP BLD-CCNC: 82 U/L (ref 40–129)
ALT SERPL-CCNC: 12 U/L (ref 0–40)
ANION GAP SERPL CALCULATED.3IONS-SCNC: 9 MMOL/L (ref 7–16)
AST SERPL-CCNC: 17 U/L (ref 0–39)
BILIRUB SERPL-MCNC: 0.3 MG/DL (ref 0–1.2)
BUN BLDV-MCNC: 19 MG/DL (ref 6–23)
CALCIUM SERPL-MCNC: 9.3 MG/DL (ref 8.6–10.2)
CHLORIDE BLD-SCNC: 107 MMOL/L (ref 98–107)
CHOLESTEROL, TOTAL: 148 MG/DL (ref 0–199)
CO2: 25 MMOL/L (ref 22–29)
CREAT SERPL-MCNC: 1.1 MG/DL (ref 0.7–1.2)
CREATININE URINE: 153 MG/DL (ref 40–278)
GFR AFRICAN AMERICAN: >60
GFR NON-AFRICAN AMERICAN: >60 ML/MIN/1.73
GLUCOSE BLD-MCNC: 114 MG/DL (ref 74–99)
HBA1C MFR BLD: 5.9 % (ref 4–5.6)
HDLC SERPL-MCNC: 37 MG/DL
LDL CHOLESTEROL CALCULATED: 86 MG/DL (ref 0–99)
MICROALBUMIN UR-MCNC: <12 MG/L
MICROALBUMIN/CREAT UR-RTO: ABNORMAL (ref 0–30)
POTASSIUM SERPL-SCNC: 4.5 MMOL/L (ref 3.5–5)
SODIUM BLD-SCNC: 141 MMOL/L (ref 132–146)
TOTAL PROTEIN: 7 G/DL (ref 6.4–8.3)
TRIGL SERPL-MCNC: 124 MG/DL (ref 0–149)
VLDLC SERPL CALC-MCNC: 25 MG/DL

## 2022-08-22 NOTE — PROGRESS NOTES
reviewed today and where indicated follow up appointments were made and/or referrals ordered. Review of Systems   Constitutional:  Negative for activity change, appetite change, chills, fatigue, fever and unexpected weight change. HENT:  Negative for congestion, postnasal drip and rhinorrhea. Eyes:  Negative for discharge and itching. Respiratory:  Negative for cough, chest tightness, shortness of breath and wheezing. Cardiovascular:  Negative for chest pain, palpitations and leg swelling. Gastrointestinal:  Negative for abdominal pain, blood in stool, constipation, diarrhea and vomiting. Genitourinary:  Negative for dysuria and frequency. Musculoskeletal:  Negative for arthralgias, back pain, gait problem, joint swelling and myalgias. Skin:  Negative for rash. Neurological:  Negative for dizziness, syncope and light-headedness. Psychiatric/Behavioral:  Negative for dysphoric mood. The patient is not nervous/anxious. Positive Risk Factor Screenings with Interventions:    Fall Risk:  Do you feel unsteady or are you worried about falling? : (!) yes  2 or more falls in past year?: no  Fall with injury in past year?: no   Fall Risk Interventions:    Home exercises provided to promote strength and balance  He notices that he loses his balance more than before. General Health and ACP:  General  In general, how would you say your health is?: Good  In the past 7 days, have you experienced any of the following: New or Increased Pain, New or Increased Fatigue, Loneliness, Social Isolation, Stress or Anger?: No  Do you get the social and emotional support that you need?: Yes  Do you have a Living Will?: Yes    Advance Directives       Power of  Living Will ACP-Advance Directive ACP-Power of     Not on File Not on File Not on File Not on File          General Health Risk Interventions:  Doing well  Willing to start strength training.     Health Habits/Nutrition:  Physical Activity: Insufficiently Active    Days of Exercise per Week: 2 days    Minutes of Exercise per Session: 30 min     Have you lost any weight without trying in the past 3 months?: (!) Yes  Body mass index: (!) 34.31  Have you seen the dentist within the past year?: Yes  Health Habits/Nutrition Interventions:  Inadequate physical activity:  educational materials provided to promote increased physical activity    Hearing/Vision:  Do you or your family notice any trouble with your hearing that hasn't been managed with hearing aids?: (!) Yes  Do you have difficulty driving, watching TV, or doing any of your daily activities because of your eyesight?: No  Have you had an eye exam within the past year?: Yes  No results found. Hearing/Vision Interventions:  Hearing concerns:  trouble hearing more recently. Safety:  Do you have working smoke detectors?: Yes  Do you have any tripping hazards - loose or unsecured carpets or rugs?: No  Do you have any tripping hazards - clutter in doorways, halls, or stairs?: No  Do you have either shower bars, grab bars, non-slip mats or non-slip surfaces in your shower or bathtub?: (!) No  Do all of your stairways have a railing or banister?: Yes  Do you always fasten your seatbelt when you are in a car?: Yes  Safety Interventions:  Home safety tips provided           Objective   Vitals:    08/23/22 1602 08/23/22 1607   BP: (!) 149/65 (!) 147/76   Resp: 18    Temp: 98.2 °F (36.8 °C)    SpO2: 97%    Weight: 246 lb (111.6 kg)    Height: 5' 11\" (1.803 m)       Body mass index is 34.31 kg/m².       General Appearance: alert and oriented to person, place and time, well-developed and well-nourished, in no acute distress  Skin: warm and dry, no rash or erythema  Head: normocephalic and atraumatic  Eyes: pupils equal, round, and reactive to light, extraocular eye movements intact, conjunctivae normal  Neck: neck supple and non tender without mass, no thyromegaly, and no thyroid nodules   Pulmonary/Chest: clear to auscultation bilaterally- no wheezes, rales or rhonchi, normal air movement, no respiratory distress  Cardiovascular: normal rate, normal S1 and S2, no gallops, and no carotid bruits  Abdomen: soft, non-tender  Extremities: 1 + edema-  bilateral   Neurologic: gait and coordination normal and speech normal       Allergies   Allergen Reactions    Novocain [Procaine]     Penicillins Other (See Comments)     Allergy tested when young d/t asthma    Lipitor [Atorvastatin] Other (See Comments)     Joint pain     Prior to Visit Medications    Medication Sig Taking? Authorizing Provider   metFORMIN (GLUCOPHAGE-XR) 500 MG extended release tablet Take 1 tablet by mouth daily (with breakfast) Yes Dao Cotton MD   metoprolol tartrate (LOPRESSOR) 25 MG tablet Take 1 tablet by mouth 2 times daily Yes Dao Cotton MD   simvastatin (ZOCOR) 40 MG tablet TAKE ONE TABLET BY MOUTH  NIGHTLY Yes Dao Cotton MD   ammonium lactate (LAC-HYDRIN) 12 % lotion Apply topically twice daily Yes Shara Adams DPM   aspirin 81 MG tablet Take 81 mg by mouth daily.  Yes Historical Provider, MD Augustine (Including outside providers/suppliers regularly involved in providing care):   Patient Care Team:  Dao Cotton MD as PCP - General (Family Medicine)  Dao Cotton MD as PCP - [facility] Empaneled Provider     Reviewed and updated this visit:  Tobacco  Allergies  Meds  Med Hx  Surg Hx  Soc Hx  Fam Hx

## 2022-08-23 ENCOUNTER — OFFICE VISIT (OUTPATIENT)
Dept: FAMILY MEDICINE CLINIC | Age: 67
End: 2022-08-23
Payer: COMMERCIAL

## 2022-08-23 VITALS
SYSTOLIC BLOOD PRESSURE: 147 MMHG | DIASTOLIC BLOOD PRESSURE: 76 MMHG | OXYGEN SATURATION: 97 % | WEIGHT: 246 LBS | TEMPERATURE: 98.2 F | RESPIRATION RATE: 18 BRPM | BODY MASS INDEX: 34.44 KG/M2 | HEIGHT: 71 IN

## 2022-08-23 DIAGNOSIS — R73.03 PREDIABETES: ICD-10-CM

## 2022-08-23 DIAGNOSIS — Z00.00 INITIAL MEDICARE ANNUAL WELLNESS VISIT: Primary | ICD-10-CM

## 2022-08-23 DIAGNOSIS — I10 ESSENTIAL HYPERTENSION: ICD-10-CM

## 2022-08-23 DIAGNOSIS — I25.10 CORONARY ARTERY DISEASE INVOLVING NATIVE CORONARY ARTERY OF NATIVE HEART WITHOUT ANGINA PECTORIS: ICD-10-CM

## 2022-08-23 DIAGNOSIS — Z87.891 HISTORY OF TOBACCO USE: ICD-10-CM

## 2022-08-23 DIAGNOSIS — E78.2 MIXED HYPERLIPIDEMIA: ICD-10-CM

## 2022-08-23 PROCEDURE — G0438 PPPS, INITIAL VISIT: HCPCS | Performed by: FAMILY MEDICINE

## 2022-08-23 PROCEDURE — 1123F ACP DISCUSS/DSCN MKR DOCD: CPT | Performed by: FAMILY MEDICINE

## 2022-08-23 RX ORDER — SIMVASTATIN 40 MG
TABLET ORAL
Qty: 90 TABLET | Refills: 1 | Status: SHIPPED | OUTPATIENT
Start: 2022-08-23

## 2022-08-23 RX ORDER — METFORMIN HYDROCHLORIDE 500 MG/1
500 TABLET, EXTENDED RELEASE ORAL
Qty: 90 TABLET | Refills: 1 | Status: SHIPPED | OUTPATIENT
Start: 2022-08-23

## 2022-08-23 SDOH — ECONOMIC STABILITY: FOOD INSECURITY: WITHIN THE PAST 12 MONTHS, THE FOOD YOU BOUGHT JUST DIDN'T LAST AND YOU DIDN'T HAVE MONEY TO GET MORE.: NEVER TRUE

## 2022-08-23 SDOH — ECONOMIC STABILITY: FOOD INSECURITY: WITHIN THE PAST 12 MONTHS, YOU WORRIED THAT YOUR FOOD WOULD RUN OUT BEFORE YOU GOT MONEY TO BUY MORE.: NEVER TRUE

## 2022-08-23 ASSESSMENT — LIFESTYLE VARIABLES
HOW OFTEN DO YOU HAVE A DRINK CONTAINING ALCOHOL: MONTHLY OR LESS
HOW MANY STANDARD DRINKS CONTAINING ALCOHOL DO YOU HAVE ON A TYPICAL DAY: 1 OR 2

## 2022-08-23 ASSESSMENT — PATIENT HEALTH QUESTIONNAIRE - PHQ9
2. FEELING DOWN, DEPRESSED OR HOPELESS: 0
SUM OF ALL RESPONSES TO PHQ9 QUESTIONS 1 & 2: 0
SUM OF ALL RESPONSES TO PHQ QUESTIONS 1-9: 0
SUM OF ALL RESPONSES TO PHQ QUESTIONS 1-9: 0
1. LITTLE INTEREST OR PLEASURE IN DOING THINGS: 0
SUM OF ALL RESPONSES TO PHQ QUESTIONS 1-9: 0
SUM OF ALL RESPONSES TO PHQ QUESTIONS 1-9: 0

## 2022-08-23 ASSESSMENT — SOCIAL DETERMINANTS OF HEALTH (SDOH): HOW HARD IS IT FOR YOU TO PAY FOR THE VERY BASICS LIKE FOOD, HOUSING, MEDICAL CARE, AND HEATING?: NOT HARD AT ALL

## 2022-08-23 NOTE — PATIENT INSTRUCTIONS
Add strength training 3 times a week to decrease your glucose. Add balance training 2-3 times a week to your day. Personalized Preventive Plan for Nancy Leiva - 8/23/2022  Medicare offers a range of preventive health benefits. Some of the tests and screenings are paid in full while other may be subject to a deductible, co-insurance, and/or copay. Some of these benefits include a comprehensive review of your medical history including lifestyle, illnesses that may run in your family, and various assessments and screenings as appropriate. After reviewing your medical record and screening and assessments performed today your provider may have ordered immunizations, labs, imaging, and/or referrals for you. A list of these orders (if applicable) as well as your Preventive Care list are included within your After Visit Summary for your review. Other Preventive Recommendations:    A preventive eye exam performed by an eye specialist is recommended every 1-2 years to screen for glaucoma; cataracts, macular degeneration, and other eye disorders. A preventive dental visit is recommended every 6 months. Try to get at least 150 minutes of exercise per week or 10,000 steps per day on a pedometer . Order or download the FREE \"Exercise & Physical Activity: Your Everyday Guide\" from [employer]. Call 1-973.892.9403 or search [employer] online. You need 4853-4839 mg of calcium and 0830-6344 IU of vitamin D per day. It is possible to meet your calcium requirement with diet alone, but a vitamin D supplement is usually necessary to meet this goal.  When exposed to the sun, use a sunscreen that protects against both UVA and UVB radiation with an SPF of 30 or greater. Reapply every 2 to 3 hours or after sweating, drying off with a towel, or swimming. Always wear a seat belt when traveling in a car. Always wear a helmet when riding a bicycle or motorcycle.

## 2022-08-24 ENCOUNTER — TELEPHONE (OUTPATIENT)
Dept: FAMILY MEDICINE CLINIC | Age: 67
End: 2022-08-24

## 2022-08-24 RX ORDER — CHLORTHALIDONE 25 MG/1
25 TABLET ORAL DAILY
Qty: 30 TABLET | Refills: 3 | Status: SHIPPED | OUTPATIENT
Start: 2022-08-24

## 2022-08-24 ASSESSMENT — ENCOUNTER SYMPTOMS
CONSTIPATION: 0
BACK PAIN: 0
WHEEZING: 0
RHINORRHEA: 0
EYE ITCHING: 0
VOMITING: 0
ABDOMINAL PAIN: 0
BLOOD IN STOOL: 0
COUGH: 0
CHEST TIGHTNESS: 0
EYE DISCHARGE: 0
DIARRHEA: 0
SHORTNESS OF BREATH: 0

## 2022-08-24 NOTE — TELEPHONE ENCOUNTER
I called patient and talked with him. He will come in to get flu vaccine, pneumonvax and have BP checked in about 1 mo.

## 2022-08-24 NOTE — TELEPHONE ENCOUNTER
Please call patient. I forgot to add a blood pressure medicine since his blood pressure was high at home and here in the office. I am sending to the pharmacy. Please come into the office in 2 -3 weeks and have his blood pressure rechecked for a nurses visit to see if it is working (schedule this visit with him on the phone please). At that nurses visit, he needs his pnuemonia vaccine. Please include that in the visit notes. [id number]. Thanks.

## 2022-08-25 NOTE — TELEPHONE ENCOUNTER
We will have flu vaccine In early October. Please make nurses visit for flu vaccine, pneumovax AND blood pressure check in early Oct.  Thanks.

## 2022-09-17 ENCOUNTER — HOSPITAL ENCOUNTER (OUTPATIENT)
Dept: ULTRASOUND IMAGING | Age: 67
Discharge: HOME | End: 2022-09-19
Payer: COMMERCIAL

## 2022-09-17 DIAGNOSIS — Z87.891 HISTORY OF TOBACCO USE: ICD-10-CM

## 2022-09-17 PROCEDURE — 76706 US ABDL AORTA SCREEN AAA: CPT

## 2022-11-23 ENCOUNTER — TELEPHONE (OUTPATIENT)
Dept: FAMILY MEDICINE CLINIC | Age: 67
End: 2022-11-23

## 2022-11-23 DIAGNOSIS — I10 ESSENTIAL HYPERTENSION: Primary | ICD-10-CM

## 2022-11-23 NOTE — TELEPHONE ENCOUNTER
Please call patient  He forgot to come in to have his blood pressure rechecked. It was high at the last visit and we need to make sure it is controlled and that we don't need to do anything more. Also, we added the new BP med and I want to get one lab to make sure his kidneys and electrolytes are doing ok on the new medicine. Thanks.

## 2022-11-25 ENCOUNTER — TELEPHONE (OUTPATIENT)
Dept: FAMILY MEDICINE CLINIC | Age: 67
End: 2022-11-25

## 2022-11-25 NOTE — TELEPHONE ENCOUNTER
Message to front staff from Dr Augustine MARKHAM JOSE MARKA call patient  He forgot to come in to have his blood pressure rechecked. It was high at the last visit and we need to make sure it is controlled and that we don't need to do anything more. Also, we added the new BP med and I want to get one lab to make sure his kidneys and electrolytes are doing ok on the new medicine. Thanks. \"      [facility] pt 11/25 and got ahold of his wife- she states that he is not taking whatever blood pressure medication he was put on last and he only took it for 3-4 days and it made him feel dizzy/sick. She stated they have blood pressure cuff at home and BP has come down and pt is not able to come in for BP/lab visit due to work schedule. Wife wants Dr Augustine Esteves to know that [facility] doesn't think they will have insurance after December so if there are tests that he or she needs done to please do those prior\".

## 2022-11-28 NOTE — TELEPHONE ENCOUNTER
Pt can't come in to have BP checked and has been checking at home. Please call wife- what have BP been at home? Also - he does not need any labs done before Dec.  Will need labs done next August and Colon cancer screening done around the same time. Thanks.

## 2022-11-28 NOTE — TELEPHONE ENCOUNTER
No labs or testing needed until next August.  He will be due for colon cancer screening and blood work at that time as well as his medicare wellness visit if he has medicare. What were some of his blood pressure readings at home on just the metoprolol?

## 2022-11-30 NOTE — TELEPHONE ENCOUNTER
Spoke with pt's wife again. She states she does not have any readings to give. He is not available and she doesn't know if he has any to give or not.

## 2023-02-06 ENCOUNTER — TELEPHONE (OUTPATIENT)
Dept: FAMILY MEDICINE CLINIC | Age: 68
End: 2023-02-06

## 2023-02-06 DIAGNOSIS — E23.0 HYPOGONADOTROPIC HYPOGONADISM (HCC): Primary | ICD-10-CM

## 2023-02-06 RX ORDER — SILDENAFIL 100 MG/1
100 TABLET, FILM COATED ORAL DAILY PRN
Qty: 30 TABLET | Refills: 1 | Status: SHIPPED | OUTPATIENT
Start: 2023-02-06

## 2023-02-06 NOTE — TELEPHONE ENCOUNTER
Last Appointment   8/23/2022  Next Appointment  2/28/2023  Would like to go on the generic viagra, do you want to wait and see him about it 2-28-23

## 2023-02-24 DIAGNOSIS — I10 ESSENTIAL HYPERTENSION: ICD-10-CM

## 2023-02-24 LAB
ANION GAP SERPL CALCULATED.3IONS-SCNC: 11 MMOL/L (ref 7–16)
BUN BLDV-MCNC: 18 MG/DL (ref 6–23)
CALCIUM SERPL-MCNC: 9 MG/DL (ref 8.6–10.2)
CHLORIDE BLD-SCNC: 103 MMOL/L (ref 98–107)
CO2: 24 MMOL/L (ref 22–29)
CREAT SERPL-MCNC: 1 MG/DL (ref 0.7–1.2)
GFR SERPL CREATININE-BSD FRML MDRD: >60 ML/MIN/1.73
GLUCOSE BLD-MCNC: 107 MG/DL (ref 74–99)
POTASSIUM SERPL-SCNC: 4.4 MMOL/L (ref 3.5–5)
SODIUM BLD-SCNC: 138 MMOL/L (ref 132–146)

## 2023-02-28 ENCOUNTER — OFFICE VISIT (OUTPATIENT)
Dept: FAMILY MEDICINE CLINIC | Age: 68
End: 2023-02-28

## 2023-02-28 VITALS
SYSTOLIC BLOOD PRESSURE: 131 MMHG | BODY MASS INDEX: 34.55 KG/M2 | HEART RATE: 73 BPM | TEMPERATURE: 98.2 F | OXYGEN SATURATION: 96 % | HEIGHT: 71 IN | RESPIRATION RATE: 16 BRPM | WEIGHT: 246.8 LBS | DIASTOLIC BLOOD PRESSURE: 78 MMHG

## 2023-02-28 DIAGNOSIS — I25.10 CORONARY ARTERY DISEASE INVOLVING NATIVE CORONARY ARTERY OF NATIVE HEART WITHOUT ANGINA PECTORIS: ICD-10-CM

## 2023-02-28 DIAGNOSIS — M72.0 CONTRACTURE OF PALMAR FASCIA (DUPUYTREN'S): ICD-10-CM

## 2023-02-28 DIAGNOSIS — E11.40 TYPE 2 DIABETES MELLITUS WITH DIABETIC NEUROPATHY, WITHOUT LONG-TERM CURRENT USE OF INSULIN (HCC): ICD-10-CM

## 2023-02-28 DIAGNOSIS — E23.0 HYPOGONADOTROPIC HYPOGONADISM (HCC): ICD-10-CM

## 2023-02-28 DIAGNOSIS — I10 ESSENTIAL HYPERTENSION: ICD-10-CM

## 2023-02-28 DIAGNOSIS — C40.12: ICD-10-CM

## 2023-02-28 DIAGNOSIS — R06.02 SHORTNESS OF BREATH: ICD-10-CM

## 2023-02-28 DIAGNOSIS — M15.9 PRIMARY OSTEOARTHRITIS INVOLVING MULTIPLE JOINTS: ICD-10-CM

## 2023-02-28 DIAGNOSIS — E78.2 MIXED HYPERLIPIDEMIA: ICD-10-CM

## 2023-02-28 DIAGNOSIS — Z29.9 ENCOUNTER FOR PREVENTIVE MEASURE: Primary | ICD-10-CM

## 2023-02-28 RX ORDER — SILDENAFIL 100 MG/1
100 TABLET, FILM COATED ORAL DAILY PRN
Qty: 30 TABLET | Refills: 1 | Status: SHIPPED | OUTPATIENT
Start: 2023-02-28

## 2023-02-28 RX ORDER — METFORMIN HYDROCHLORIDE 500 MG/1
500 TABLET, EXTENDED RELEASE ORAL
Qty: 90 TABLET | Refills: 1 | Status: SHIPPED | OUTPATIENT
Start: 2023-02-28

## 2023-02-28 RX ORDER — SIMVASTATIN 40 MG
TABLET ORAL
Qty: 90 TABLET | Refills: 1 | Status: SHIPPED | OUTPATIENT
Start: 2023-02-28

## 2023-02-28 SDOH — ECONOMIC STABILITY: HOUSING INSECURITY
IN THE LAST 12 MONTHS, WAS THERE A TIME WHEN YOU DID NOT HAVE A STEADY PLACE TO SLEEP OR SLEPT IN A SHELTER (INCLUDING NOW)?: NO

## 2023-02-28 SDOH — ECONOMIC STABILITY: FOOD INSECURITY: WITHIN THE PAST 12 MONTHS, YOU WORRIED THAT YOUR FOOD WOULD RUN OUT BEFORE YOU GOT MONEY TO BUY MORE.: NEVER TRUE

## 2023-02-28 SDOH — ECONOMIC STABILITY: INCOME INSECURITY: HOW HARD IS IT FOR YOU TO PAY FOR THE VERY BASICS LIKE FOOD, HOUSING, MEDICAL CARE, AND HEATING?: NOT VERY HARD

## 2023-02-28 SDOH — ECONOMIC STABILITY: FOOD INSECURITY: WITHIN THE PAST 12 MONTHS, THE FOOD YOU BOUGHT JUST DIDN'T LAST AND YOU DIDN'T HAVE MONEY TO GET MORE.: NEVER TRUE

## 2023-02-28 ASSESSMENT — ENCOUNTER SYMPTOMS
CHEST TIGHTNESS: 0
DIARRHEA: 0
WHEEZING: 0
SHORTNESS OF BREATH: 1
ABDOMINAL PAIN: 0
COUGH: 0
CONSTIPATION: 0

## 2023-02-28 ASSESSMENT — PATIENT HEALTH QUESTIONNAIRE - PHQ9
1. LITTLE INTEREST OR PLEASURE IN DOING THINGS: 0
SUM OF ALL RESPONSES TO PHQ QUESTIONS 1-9: 0
2. FEELING DOWN, DEPRESSED OR HOPELESS: 0
SUM OF ALL RESPONSES TO PHQ QUESTIONS 1-9: 0
SUM OF ALL RESPONSES TO PHQ9 QUESTIONS 1 & 2: 0
SUM OF ALL RESPONSES TO PHQ QUESTIONS 1-9: 0
SUM OF ALL RESPONSES TO PHQ QUESTIONS 1-9: 0

## 2023-02-28 NOTE — PROGRESS NOTES
Clarence Ashford (:  1955) is a 68 y.o. male,Established patient, here for evaluation of the following chief complaint(s):  Hypertension and Coronary Artery Disease         ASSESSMENT/PLAN:  1. Encounter for preventive measure  -     Influenza, FLUAD, (age 65 y+), IM, Preservative Free, 0.5 mL  -     PSA Screening; Future  Counseled patient on PSA screening.  Pt decided to move forward with it.  Flu vaccine given today.   2. Type 2 diabetes mellitus with diabetic neuropathy, without long-term current use of insulin (HCC)  -     Hemoglobin A1C; Future  -     Microalbumin / Creatinine Urine Ratio; Future  -      DIABETES FOOT EXAM  -     metFORMIN (GLUCOPHAGE-XR) 500 MG extended release tablet; Take 1 tablet by mouth daily (with breakfast), Disp-90 tablet, R-1Normal  Pt ed that he needs his eye exam.    Will cont to follow closely.  Currently well controlled with diet and metformin.  Foot exam done today.  Labs ordered for prior to next visit.   3. 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   4. Hypogonadotropic hypogonadism (HCC)  -     sildenafil (VIAGRA) 100 MG tablet; Take 1 tablet by mouth daily as needed for Erectile Dysfunction, Disp-30 tablet, R-1Normal  5. Essential hypertension  -     metoprolol tartrate (LOPRESSOR) 25 MG tablet; Take 1 tablet by mouth 2 times daily, Disp-180 tablet, R-1Normal  Well controlled on current meds.    6. Coronary artery disease involving native coronary artery of native heart without angina pectoris  -     Comprehensive Metabolic Panel; Future  -     Lipid, Fasting; Future  -     metoprolol tartrate (LOPRESSOR) 25 MG tablet; Take 1 tablet by mouth 2 times daily, Disp-180 tablet, R-1Normal  -     simvastatin (ZOCOR) 40 MG tablet; TAKE ONE TABLET BY MOUTH  NIGHTLY, Disp-90 tablet, R-1Normal  -     Cardiac Stress Test - w/Pharm; Future  Pt with new SOB on incline.  Will get stress test.  Can't  do exercise because of his knees and hips. Needs pharmacologic. Pt on ASA 81 daily. 7. Mixed hyperlipidemia  -     simvastatin (ZOCOR) 40 MG tablet; TAKE ONE TABLET BY MOUTH  NIGHTLY, Disp-90 tablet, R-1Normal  Cont on current meds. 8. Shortness of breath  -     Cardiac Stress Test - w/Pharm; Future  9. Contracture of palmar fascia (Dupuytren's)  Pt has an orthopod that he follows with for his OA and his dupuytrens. Will follow up with them. 10. Primary osteoarthritis involving multiple joints  See above. Return in about 6 months (around 8/28/2023) for AWV. Subjective   SUBJECTIVE/OBJECTIVE:  HPI    Check labs prior to AWV in Sept    DM - pt on metformin. Watching his carb intake. Needs foot exam - done  Eye exam?  Goes in June. CAD with HTN - pt on ASA, metoprolol, zocor. Had BMP checked this week. WNL except glucose was high. Started on HCTZ - no longer taking because he felt dizzy/sick on it. BP at home has been 131/78 off the HCTZ. Has some SOB on incline. He gets it every tme and this is new for him. Sees Dr. Opal Franz. Has not seen him in many years. Last time he had heart attack had pain in shoulders and elbows and had chest pressure. He was smoking, eating poorly. Hypogonadism - on viagra. This works. They needs sent to [facility] to see what the cost is. May have to order online. Hand lesion - derm? All. He goes to [facility] in Nov, Dr. Tameka Dobbins. Ortho -   Knees, hips  Right hand palmar contracture. This is finally bothering him and he may want to get it taken care of. Review of Systems   Constitutional:  Negative for activity change, appetite change, diaphoresis, fatigue, fever and unexpected weight change. HENT:  Negative for congestion and postnasal drip. Respiratory:  Positive for shortness of breath. Negative for cough, chest tightness and wheezing. Cardiovascular:  Negative for chest pain, palpitations and leg swelling. Gastrointestinal:  Negative for abdominal pain, constipation and diarrhea. Genitourinary:  Negative for difficulty urinating, frequency and urgency. Musculoskeletal:  Positive for arthralgias and gait problem. Skin:  Negative for rash. Neurological:  Negative for dizziness, speech difficulty, weakness and light-headedness. Objective   Physical Exam  Vitals and nursing note reviewed. Constitutional:       Appearance: Normal appearance. He is well-developed. He is not ill-appearing or toxic-appearing. HENT:      Head: Normocephalic and atraumatic. Eyes:      General: No scleral icterus. Right eye: No discharge. Left eye: No discharge. Neck:      Thyroid: No thyromegaly. Vascular: No carotid bruit. Cardiovascular:      Rate and Rhythm: Normal rate and regular rhythm. Pulses:           Dorsalis pedis pulses are 1+ on the right side and 1+ on the left side. Posterior tibial pulses are 1+ on the right side and 1+ on the left side. Heart sounds: Normal heart sounds, S1 normal and S2 normal. No murmur heard. No friction rub. No gallop. Pulmonary:      Effort: Pulmonary effort is normal. No respiratory distress. Breath sounds: Normal breath sounds and air entry. No decreased breath sounds, wheezing or rales. Abdominal:      Palpations: Abdomen is soft. Tenderness: There is no abdominal tenderness. Musculoskeletal:      Cervical back: Neck supple. Right lower leg: No edema. Left lower leg: No edema. Right foot: Normal range of motion. No deformity. Left foot: Normal range of motion. No deformity. Feet:      Right foot:      Protective Sensation: 10 sites tested. 10 sites sensed. Skin integrity: Callus and dry skin present. No fissure. Left foot:      Protective Sensation: 10 sites tested. 10 sites sensed. Skin integrity: Callus and dry skin present. No fissure.    Lymphadenopathy:      Cervical: No cervical adenopathy. Skin:     General: Skin is warm and dry. Neurological:      General: No focal deficit present. Mental Status: He is alert and oriented to person, place, and time. Gait: Gait normal.   Psychiatric:         Attention and Perception: Attention normal.         Mood and Affect: Mood and affect normal.         Speech: Speech normal.         Behavior: Behavior normal. Behavior is cooperative. Thought Content: Thought content normal.         Cognition and Memory: Cognition and memory normal.         Judgment: Judgment normal.          An electronic signature was used to authenticate this note.     --Jossy Agrawal MD

## 2023-03-02 PROBLEM — M15.9 PRIMARY OSTEOARTHRITIS INVOLVING MULTIPLE JOINTS: Status: ACTIVE | Noted: 2023-03-02

## 2023-03-02 PROBLEM — M72.0 CONTRACTURE OF PALMAR FASCIA (DUPUYTREN'S): Status: ACTIVE | Noted: 2023-03-02

## 2023-03-02 PROBLEM — R73.03 PREDIABETES: Status: RESOLVED | Noted: 2021-09-01 | Resolved: 2023-03-02

## 2023-03-02 PROBLEM — M15.0 PRIMARY OSTEOARTHRITIS INVOLVING MULTIPLE JOINTS: Status: ACTIVE | Noted: 2023-03-02

## 2023-03-07 DIAGNOSIS — E11.40 TYPE 2 DIABETES MELLITUS WITH DIABETIC NEUROPATHY, WITHOUT LONG-TERM CURRENT USE OF INSULIN (HCC): ICD-10-CM

## 2023-03-07 RX ORDER — METFORMIN HYDROCHLORIDE 500 MG/1
TABLET, EXTENDED RELEASE ORAL
Qty: 90 TABLET | Refills: 0 | OUTPATIENT
Start: 2023-03-07

## 2023-07-03 ENCOUNTER — TELEPHONE (OUTPATIENT)
Dept: FAMILY MEDICINE CLINIC | Age: 68
End: 2023-07-03

## 2023-07-03 DIAGNOSIS — Z00.00 ENCOUNTER FOR PREVENTIVE CARE: Primary | ICD-10-CM

## 2023-07-03 NOTE — TELEPHONE ENCOUNTER
Please call patient. Does he need me to change his Feb visit to a preventive visit? Also, I am sending him out a cologuard unless he is having issues with his bowels like: change in bowel movements, blood in stool. He is now due for this screening.

## 2023-08-09 ENCOUNTER — TELEPHONE (OUTPATIENT)
Dept: FAMILY MEDICINE CLINIC | Age: 68
End: 2023-08-09

## 2023-08-09 NOTE — TELEPHONE ENCOUNTER
----- Message from Gualberto Carver sent at 8/9/2023  2:52 PM EDT -----  Regarding: Labs done on 2/24  Radha Rolle (pt wife) called this afternoon asking for lab from Feb 24 to be re coded/billed as \"preventative\" because her and Avis Giron received bill for the lab done on that date and billing department and the Lab told her they received the bill because the coding was not entered as preventative care. After that order has been changed for both patients, please fax the order to: [phone]: Coding Dept. Informed pt was not sure if this was what needed to be done or if we were able to do so but would send the message. Advised pt I would give her billing number and she states she doesn't want the billing number because she has already talked to them a bunch of times. She wants PCP to fix the order.

## 2023-08-09 NOTE — TELEPHONE ENCOUNTER
8/9 staff message sent to PCP about changing billing for lab done on 2/24.  Per wife, pam's request.

## 2023-08-11 NOTE — TELEPHONE ENCOUNTER
This change was made in July and sent to the lab when I made the change for the office visit. Have asked the  to look into it. Thanks.   [email or web address]

## 2023-08-25 DIAGNOSIS — I25.10 CORONARY ARTERY DISEASE INVOLVING NATIVE CORONARY ARTERY OF NATIVE HEART WITHOUT ANGINA PECTORIS: ICD-10-CM

## 2023-08-25 DIAGNOSIS — I10 ESSENTIAL HYPERTENSION: ICD-10-CM

## 2023-08-25 DIAGNOSIS — E78.2 MIXED HYPERLIPIDEMIA: ICD-10-CM

## 2023-08-26 RX ORDER — SIMVASTATIN 40 MG
TABLET ORAL
Qty: 90 TABLET | Refills: 0 | Status: SHIPPED | OUTPATIENT
Start: 2023-08-26

## 2024-06-14 ENCOUNTER — TELEPHONE (OUTPATIENT)
Dept: CARDIOLOGY CLINIC | Age: 69
End: 2024-06-14

## 2024-06-14 PROBLEM — G89.29 CHRONIC PAIN OF BOTH KNEES: Chronic | Status: RESOLVED | Noted: 2019-04-22 | Resolved: 2024-06-14

## 2024-06-14 PROBLEM — M72.0 CONTRACTURE OF PALMAR FASCIA (DUPUYTREN'S): Status: RESOLVED | Noted: 2023-03-02 | Resolved: 2024-06-14

## 2024-06-14 PROBLEM — M25.561 CHRONIC PAIN OF BOTH KNEES: Chronic | Status: RESOLVED | Noted: 2019-04-22 | Resolved: 2024-06-14

## 2024-06-14 PROBLEM — M25.562 CHRONIC PAIN OF BOTH KNEES: Chronic | Status: RESOLVED | Noted: 2019-04-22 | Resolved: 2024-06-14

## 2024-06-14 PROBLEM — M15.0 PRIMARY OSTEOARTHRITIS INVOLVING MULTIPLE JOINTS: Status: RESOLVED | Noted: 2023-03-02 | Resolved: 2024-06-14

## 2024-06-14 PROBLEM — M15.9 PRIMARY OSTEOARTHRITIS INVOLVING MULTIPLE JOINTS: Status: RESOLVED | Noted: 2023-03-02 | Resolved: 2024-06-14

## 2024-06-17 ENCOUNTER — HOSPITAL ENCOUNTER (OUTPATIENT)
Age: 69
Discharge: HOME | End: 2024-06-19

## 2024-06-17 ENCOUNTER — OFFICE VISIT (OUTPATIENT)
Dept: CARDIOLOGY CLINIC | Age: 69
End: 2024-06-17
Payer: MEDICARE

## 2024-06-17 VITALS
WEIGHT: 245.4 LBS | RESPIRATION RATE: 18 BRPM | HEIGHT: 71 IN | DIASTOLIC BLOOD PRESSURE: 79 MMHG | HEART RATE: 53 BPM | SYSTOLIC BLOOD PRESSURE: 135 MMHG | BODY MASS INDEX: 34.35 KG/M2

## 2024-06-17 DIAGNOSIS — I25.10 CORONARY ARTERY DISEASE INVOLVING NATIVE CORONARY ARTERY OF NATIVE HEART WITHOUT ANGINA PECTORIS: Chronic | ICD-10-CM

## 2024-06-17 DIAGNOSIS — M25.559 HIP PAIN, UNSPECIFIED LATERALITY: ICD-10-CM

## 2024-06-17 DIAGNOSIS — Z01.810 PREOP CARDIOVASCULAR EXAM: Primary | ICD-10-CM

## 2024-06-17 LAB
ABO + RH BLD: NORMAL
ALBUMIN SERPL-MCNC: 4 G/DL (ref 3.5–5.2)
ALP SERPL-CCNC: 81 U/L (ref 40–129)
ALT SERPL-CCNC: 11 U/L (ref 0–40)
ANION GAP SERPL CALCULATED.3IONS-SCNC: 10 MMOL/L (ref 7–16)
ARM BAND NUMBER: NORMAL
AST SERPL-CCNC: 12 U/L (ref 0–39)
BASOPHILS # BLD: 0.09 K/UL (ref 0–0.2)
BASOPHILS NFR BLD: 1 % (ref 0–2)
BILIRUB SERPL-MCNC: 0.3 MG/DL (ref 0–1.2)
BILIRUB UR QL STRIP: NEGATIVE
BLOOD BANK SAMPLE EXPIRATION: NORMAL
BLOOD GROUP ANTIBODIES SERPL: NEGATIVE
BUN SERPL-MCNC: 23 MG/DL (ref 6–23)
CALCIUM SERPL-MCNC: 9 MG/DL (ref 8.6–10.2)
CHLORIDE SERPL-SCNC: 105 MMOL/L (ref 98–107)
CLARITY UR: CLEAR
CO2 SERPL-SCNC: 24 MMOL/L (ref 22–29)
COLOR UR: YELLOW
COMMENT: ABNORMAL
CREAT SERPL-MCNC: 1 MG/DL (ref 0.7–1.2)
EOSINOPHIL # BLD: 0.3 K/UL (ref 0.05–0.5)
EOSINOPHILS RELATIVE PERCENT: 5 % (ref 0–6)
ERYTHROCYTE [DISTWIDTH] IN BLOOD BY AUTOMATED COUNT: 12.7 % (ref 11.5–15)
ERYTHROCYTE [SEDIMENTATION RATE] IN BLOOD BY WESTERGREN METHOD: 17 MM/HR (ref 0–15)
GFR, ESTIMATED: 88 ML/MIN/1.73M2
GLUCOSE SERPL-MCNC: 114 MG/DL (ref 74–99)
GLUCOSE UR STRIP-MCNC: NEGATIVE MG/DL
HBA1C MFR BLD: 6 % (ref 4–5.6)
HCT VFR BLD AUTO: 36.8 % (ref 37–54)
HGB BLD-MCNC: 12.3 G/DL (ref 12.5–16.5)
HGB UR QL STRIP.AUTO: NEGATIVE
IMM GRANULOCYTES # BLD AUTO: <0.03 K/UL (ref 0–0.58)
IMM GRANULOCYTES NFR BLD: 0 % (ref 0–5)
INR PPP: 1
KETONES UR STRIP-MCNC: NEGATIVE MG/DL
LEUKOCYTE ESTERASE UR QL STRIP: NEGATIVE
LYMPHOCYTES NFR BLD: 1.59 K/UL (ref 1.5–4)
LYMPHOCYTES RELATIVE PERCENT: 25 % (ref 20–42)
MCH RBC QN AUTO: 30.3 PG (ref 26–35)
MCHC RBC AUTO-ENTMCNC: 33.4 G/DL (ref 32–34.5)
MCV RBC AUTO: 90.6 FL (ref 80–99.9)
MONOCYTES NFR BLD: 0.67 K/UL (ref 0.1–0.95)
MONOCYTES NFR BLD: 11 % (ref 2–12)
NEUTROPHILS NFR BLD: 58 % (ref 43–80)
NEUTS SEG NFR BLD: 3.71 K/UL (ref 1.8–7.3)
NITRITE UR QL STRIP: NEGATIVE
PARTIAL THROMBOPLASTIN TIME: 31.3 SEC (ref 24.5–35.1)
PH UR STRIP: 6 [PH] (ref 5–9)
PLATELET # BLD AUTO: 156 K/UL (ref 130–450)
PMV BLD AUTO: 10.8 FL (ref 7–12)
POTASSIUM SERPL-SCNC: 4.1 MMOL/L (ref 3.5–5)
PROT SERPL-MCNC: 6.8 G/DL (ref 6.4–8.3)
PROT UR STRIP-MCNC: NEGATIVE MG/DL
PROTHROMBIN TIME: 11.1 SEC (ref 9.3–12.4)
RBC # BLD AUTO: 4.06 M/UL (ref 3.8–5.8)
SODIUM SERPL-SCNC: 139 MMOL/L (ref 132–146)
SP GR UR STRIP: >1.03 (ref 1–1.03)
UROBILINOGEN UR STRIP-ACNC: 0.2 EU/DL (ref 0–1)
WBC OTHER # BLD: 6.4 K/UL (ref 4.5–11.5)

## 2024-06-17 PROCEDURE — 86901 BLOOD TYPING SEROLOGIC RH(D): CPT

## 2024-06-17 PROCEDURE — 85610 PROTHROMBIN TIME: CPT

## 2024-06-17 PROCEDURE — 3078F DIAST BP <80 MM HG: CPT | Performed by: INTERNAL MEDICINE

## 2024-06-17 PROCEDURE — 86850 RBC ANTIBODY SCREEN: CPT

## 2024-06-17 PROCEDURE — 1123F ACP DISCUSS/DSCN MKR DOCD: CPT | Performed by: INTERNAL MEDICINE

## 2024-06-17 PROCEDURE — 85652 RBC SED RATE AUTOMATED: CPT

## 2024-06-17 PROCEDURE — 99203 OFFICE O/P NEW LOW 30 MIN: CPT | Performed by: INTERNAL MEDICINE

## 2024-06-17 PROCEDURE — 85730 THROMBOPLASTIN TIME PARTIAL: CPT

## 2024-06-17 PROCEDURE — 85025 COMPLETE CBC W/AUTO DIFF WBC: CPT

## 2024-06-17 PROCEDURE — 81003 URINALYSIS AUTO W/O SCOPE: CPT

## 2024-06-17 PROCEDURE — 87086 URINE CULTURE/COLONY COUNT: CPT

## 2024-06-17 PROCEDURE — 3075F SYST BP GE 130 - 139MM HG: CPT | Performed by: INTERNAL MEDICINE

## 2024-06-17 PROCEDURE — 83036 HEMOGLOBIN GLYCOSYLATED A1C: CPT

## 2024-06-17 PROCEDURE — 93000 ELECTROCARDIOGRAM COMPLETE: CPT | Performed by: INTERNAL MEDICINE

## 2024-06-17 PROCEDURE — 86900 BLOOD TYPING SEROLOGIC ABO: CPT

## 2024-06-17 PROCEDURE — 80053 COMPREHEN METABOLIC PANEL: CPT

## 2024-06-17 PROCEDURE — 87081 CULTURE SCREEN ONLY: CPT

## 2024-06-17 RX ORDER — HYDROCODONE BITARTRATE AND ACETAMINOPHEN 7.5; 325 MG/1; MG/1
TABLET ORAL
COMMUNITY
Start: 2024-03-12

## 2024-06-17 RX ORDER — IBUPROFEN 200 MG
200 TABLET ORAL EVERY 6 HOURS PRN
COMMUNITY

## 2024-06-17 NOTE — PROGRESS NOTES
Pulse: 53   Resp: 18   Weight: 111.3 kg (245 lb 6.4 oz)   Height: 1.803 m (5' 11\")             SUBJECTIVE: Clarence Ashford presents to the office today for consult for pre-op evaluation prior to hip surgery with Dr. Dutton    He complains of no new or unstable cardiac symptoms,  and denies dyspnea, exertional chest pressure/discomfort, fatigue, irregular heart beat, lower extremity edema, near-syncope, orthopnea, palpitations, paroxysmal nocturnal dyspnea, syncope, and tachypnea.  Ex dr guillen patient - I reviewed records  Up until 6 months ago was working,walking for exercise, doing chores  Non smoker. . Last LDL 79          Physical Exam   /79   Pulse 53   Resp 18   Ht 1.803 m (5' 11\")   Wt 111.3 kg (245 lb 6.4 oz)   BMI 34.23 kg/m²   Constitutional: Oriented to person, place, and time. Obese No distress.    Neck: No carotid bruit, no JVD present.  Cardiovascular: Normal rate, regular rhythm, normal heart sounds and intact distal pulses.   No murmur heard.  Pulmonary/Chest: Effort normal and breath sounds normal.    Abdominal: Soft. Bowel sounds are normal.  Musculoskeletal: No edema   Neurological: Alert and oriented to person, place, and time.   Skin: Skin is warm and dry.   Psychiatric: Normal mood and affect. Behavior is normal.     EKG:  normal sinus rhythm, RBBB, unchanged from previous tracing of 2019.    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

## 2024-06-18 LAB
MICROORGANISM SPEC CULT: NO GROWTH
SPECIMEN DESCRIPTION: NORMAL

## 2024-06-19 LAB
MICROORGANISM SPEC CULT: NORMAL
SPECIMEN DESCRIPTION: NORMAL

## 2024-06-29 ENCOUNTER — HOSPITAL ENCOUNTER (OUTPATIENT)
Age: 69
Discharge: HOME | End: 2024-07-01

## 2024-06-29 LAB
ANION GAP SERPL CALCULATED.3IONS-SCNC: 11 MMOL/L (ref 7–16)
BUN SERPL-MCNC: 17 MG/DL (ref 6–23)
CALCIUM SERPL-MCNC: 8.8 MG/DL (ref 8.6–10.2)
CHLORIDE SERPL-SCNC: 102 MMOL/L (ref 98–107)
CO2 SERPL-SCNC: 23 MMOL/L (ref 22–29)
CREAT SERPL-MCNC: 1.3 MG/DL (ref 0.7–1.2)
ERYTHROCYTE [DISTWIDTH] IN BLOOD BY AUTOMATED COUNT: 12.9 % (ref 11.5–15)
GFR, ESTIMATED: 63 ML/MIN/1.73M2
GLUCOSE SERPL-MCNC: 79 MG/DL (ref 74–99)
HCT VFR BLD AUTO: 37.8 % (ref 37–54)
HGB BLD-MCNC: 12.3 G/DL (ref 12.5–16.5)
MCH RBC QN AUTO: 30.6 PG (ref 26–35)
MCHC RBC AUTO-ENTMCNC: 32.5 G/DL (ref 32–34.5)
MCV RBC AUTO: 94 FL (ref 80–99.9)
PLATELET # BLD AUTO: 135 K/UL (ref 130–450)
PMV BLD AUTO: 12.3 FL (ref 7–12)
POTASSIUM SERPL-SCNC: 4.5 MMOL/L (ref 3.5–5)
RBC # BLD AUTO: 4.02 M/UL (ref 3.8–5.8)
SODIUM SERPL-SCNC: 136 MMOL/L (ref 132–146)
WBC OTHER # BLD: 12 K/UL (ref 4.5–11.5)

## 2024-06-29 PROCEDURE — 85027 COMPLETE CBC AUTOMATED: CPT

## 2024-06-29 PROCEDURE — 80048 BASIC METABOLIC PNL TOTAL CA: CPT

## 2024-11-15 ENCOUNTER — HOSPITAL ENCOUNTER (OUTPATIENT)
Age: 69
Discharge: HOME | End: 2024-11-17

## 2024-11-15 PROCEDURE — 88304 TISSUE EXAM BY PATHOLOGIST: CPT

## 2024-11-20 LAB — SURGICAL PATHOLOGY REPORT: NORMAL

## (undated) DEVICE — DRAPE,EXTREMITY,89X128,STERILE: Brand: MEDLINE

## (undated) DEVICE — SPONGE GZ W4XL4IN RAYON POLY FILL CVR W/ NONWOVEN FAB

## (undated) DEVICE — DOUBLE BASIN SET: Brand: MEDLINE INDUSTRIES, INC.

## (undated) DEVICE — DRESSING,GAUZE,XEROFORM,CURAD,1"X8",ST: Brand: CURAD

## (undated) DEVICE — TOWEL,OR,DSP,ST,BLUE,STD,6/PK,12PK/CS: Brand: MEDLINE

## (undated) DEVICE — NEEDLE HYPO 25GA L1.5IN BLU POLYPR HUB S STL REG BVL STR

## (undated) DEVICE — GOWN,SIRUS,FABRNF,L,20/CS: Brand: MEDLINE

## (undated) DEVICE — BLADE ES ELASTOMERIC COAT INSUL DURABLE BEND UPTO 90DEG

## (undated) DEVICE — PACK PROCEDURE SURG GEN CUST

## (undated) DEVICE — GOWN,SIRUS,FABRNF,2XL,18/CS: Brand: MEDLINE

## (undated) DEVICE — SUTURE MCRYL SZ 3-0 L18IN ABSRB UD L19MM PS-2 3/8 CIR PRIM Y497G

## (undated) DEVICE — SUTURE ETHLN SZ 3-0 L18IN NONABSORBABLE BLK L24MM PS-1 3/8 1663G

## (undated) DEVICE — GLOVE ORANGE PI 7   MSG9070

## (undated) DEVICE — FORCEPS RAT TOOTH 1X2

## (undated) DEVICE — CHLORAPREP 26ML ORANGE

## (undated) DEVICE — 4-PORT MANIFOLD: Brand: NEPTUNE 2

## (undated) DEVICE — SYRINGE 20ML LL S/C 50

## (undated) DEVICE — INTENDED FOR TISSUE SEPARATION, AND OTHER PROCEDURES THAT REQUIRE A SHARP SURGICAL BLADE TO PUNCTURE OR CUT.: Brand: BARD-PARKER ® STAINLESS STEEL BLADES

## (undated) DEVICE — SHEET, T, LAPAROTOMY, STERILE: Brand: MEDLINE

## (undated) DEVICE — BANDAGE,GAUZE,BULKEE II,4.5"X4.1YD,STRL: Brand: MEDLINE

## (undated) DEVICE — GLOVE ORANGE PI 7 1/2   MSG9075

## (undated) DEVICE — ELECTRODE PT RET AD L9FT HI MOIST COND ADH HYDRGEL CORDED